# Patient Record
Sex: FEMALE | Race: WHITE | HISPANIC OR LATINO | Employment: STUDENT | URBAN - METROPOLITAN AREA
[De-identification: names, ages, dates, MRNs, and addresses within clinical notes are randomized per-mention and may not be internally consistent; named-entity substitution may affect disease eponyms.]

---

## 2023-10-08 ENCOUNTER — APPOINTMENT (OUTPATIENT)
Dept: GENERAL RADIOLOGY | Facility: HOSPITAL | Age: 48
End: 2023-10-08
Payer: COMMERCIAL

## 2023-10-08 ENCOUNTER — HOSPITAL ENCOUNTER (OUTPATIENT)
Facility: HOSPITAL | Age: 48
Setting detail: OBSERVATION
Discharge: HOME | End: 2023-10-09
Attending: EMERGENCY MEDICINE | Admitting: INTERNAL MEDICINE
Payer: COMMERCIAL

## 2023-10-08 ENCOUNTER — APPOINTMENT (OUTPATIENT)
Dept: CT IMAGING | Facility: HOSPITAL | Age: 48
End: 2023-10-08
Payer: COMMERCIAL

## 2023-10-08 DIAGNOSIS — R56.9 NEW ONSET SEIZURE: Primary | ICD-10-CM

## 2023-10-08 DIAGNOSIS — R41.82 ALTERED MENTAL STATUS, UNSPECIFIED ALTERED MENTAL STATUS TYPE: ICD-10-CM

## 2023-10-08 LAB
ALBUMIN SERPL-MCNC: 4 G/DL (ref 3.5–5.2)
ALBUMIN/GLOB SERPL: 1.3 G/DL
ALP SERPL-CCNC: 64 U/L (ref 39–117)
ALT SERPL W P-5'-P-CCNC: 14 U/L (ref 1–33)
AMPHET+METHAMPHET UR QL: NEGATIVE
AMPHETAMINES UR QL: NEGATIVE
ANION GAP SERPL CALCULATED.3IONS-SCNC: 11 MMOL/L (ref 5–15)
AST SERPL-CCNC: 20 U/L (ref 1–32)
BACTERIA UR QL AUTO: ABNORMAL /HPF
BARBITURATES UR QL SCN: NEGATIVE
BASOPHILS # BLD AUTO: 0.05 10*3/MM3 (ref 0–0.2)
BASOPHILS NFR BLD AUTO: 0.6 % (ref 0–1.5)
BENZODIAZ UR QL SCN: NEGATIVE
BILIRUB SERPL-MCNC: 0.5 MG/DL (ref 0–1.2)
BILIRUB UR QL STRIP: NEGATIVE
BUN SERPL-MCNC: 12 MG/DL (ref 6–20)
BUN/CREAT SERPL: 13.8 (ref 7–25)
BUPRENORPHINE SERPL-MCNC: NEGATIVE NG/ML
CALCIUM SPEC-SCNC: 8.7 MG/DL (ref 8.6–10.5)
CANNABINOIDS SERPL QL: NEGATIVE
CHLORIDE SERPL-SCNC: 104 MMOL/L (ref 98–107)
CLARITY UR: CLEAR
CO2 SERPL-SCNC: 20 MMOL/L (ref 22–29)
COCAINE UR QL: NEGATIVE
COLOR UR: YELLOW
CREAT SERPL-MCNC: 0.87 MG/DL (ref 0.57–1)
DEPRECATED RDW RBC AUTO: 47.4 FL (ref 37–54)
EGFRCR SERPLBLD CKD-EPI 2021: 82.8 ML/MIN/1.73
EOSINOPHIL # BLD AUTO: 0.12 10*3/MM3 (ref 0–0.4)
EOSINOPHIL NFR BLD AUTO: 1.5 % (ref 0.3–6.2)
ERYTHROCYTE [DISTWIDTH] IN BLOOD BY AUTOMATED COUNT: 13.5 % (ref 12.3–15.4)
FENTANYL UR-MCNC: NEGATIVE NG/ML
GLOBULIN UR ELPH-MCNC: 3 GM/DL
GLUCOSE SERPL-MCNC: 91 MG/DL (ref 65–99)
GLUCOSE UR STRIP-MCNC: NEGATIVE MG/DL
HCT VFR BLD AUTO: 36.1 % (ref 34–46.6)
HGB BLD-MCNC: 12 G/DL (ref 12–15.9)
HGB UR QL STRIP.AUTO: ABNORMAL
HOLD SPECIMEN: NORMAL
HYALINE CASTS UR QL AUTO: ABNORMAL /LPF
IMM GRANULOCYTES # BLD AUTO: 0.01 10*3/MM3 (ref 0–0.05)
IMM GRANULOCYTES NFR BLD AUTO: 0.1 % (ref 0–0.5)
KETONES UR QL STRIP: ABNORMAL
LEUKOCYTE ESTERASE UR QL STRIP.AUTO: NEGATIVE
LYMPHOCYTES # BLD AUTO: 2.33 10*3/MM3 (ref 0.7–3.1)
LYMPHOCYTES NFR BLD AUTO: 29.5 % (ref 19.6–45.3)
MAGNESIUM SERPL-MCNC: 2.5 MG/DL (ref 1.6–2.6)
MCH RBC QN AUTO: 31.7 PG (ref 26.6–33)
MCHC RBC AUTO-ENTMCNC: 33.2 G/DL (ref 31.5–35.7)
MCV RBC AUTO: 95.3 FL (ref 79–97)
METHADONE UR QL SCN: NEGATIVE
MONOCYTES # BLD AUTO: 0.55 10*3/MM3 (ref 0.1–0.9)
MONOCYTES NFR BLD AUTO: 7 % (ref 5–12)
NEUTROPHILS NFR BLD AUTO: 4.84 10*3/MM3 (ref 1.7–7)
NEUTROPHILS NFR BLD AUTO: 61.3 % (ref 42.7–76)
NITRITE UR QL STRIP: NEGATIVE
NRBC BLD AUTO-RTO: 0 /100 WBC (ref 0–0.2)
OPIATES UR QL: NEGATIVE
OXYCODONE UR QL SCN: NEGATIVE
PCP UR QL SCN: NEGATIVE
PH UR STRIP.AUTO: 7 [PH] (ref 5–8)
PLATELET # BLD AUTO: 367 10*3/MM3 (ref 140–450)
PMV BLD AUTO: 7.7 FL (ref 6–12)
POTASSIUM SERPL-SCNC: 3.9 MMOL/L (ref 3.5–5.2)
PROPOXYPH UR QL: NEGATIVE
PROT SERPL-MCNC: 7 G/DL (ref 6–8.5)
PROT UR QL STRIP: NEGATIVE
QT INTERVAL: 392 MS
QTC INTERVAL: 407 MS
RBC # BLD AUTO: 3.79 10*6/MM3 (ref 3.77–5.28)
RBC # UR STRIP: ABNORMAL /HPF
REF LAB TEST METHOD: ABNORMAL
SODIUM SERPL-SCNC: 135 MMOL/L (ref 136–145)
SP GR UR STRIP: 1.01 (ref 1–1.03)
SQUAMOUS #/AREA URNS HPF: ABNORMAL /HPF
TRICYCLICS UR QL SCN: NEGATIVE
TROPONIN T SERPL HS-MCNC: <6 NG/L
UROBILINOGEN UR QL STRIP: ABNORMAL
WBC # UR STRIP: ABNORMAL /HPF
WBC NRBC COR # BLD: 7.9 10*3/MM3 (ref 3.4–10.8)
WHOLE BLOOD HOLD COAG: NORMAL
WHOLE BLOOD HOLD SPECIMEN: NORMAL

## 2023-10-08 PROCEDURE — G0378 HOSPITAL OBSERVATION PER HR: HCPCS

## 2023-10-08 PROCEDURE — 83735 ASSAY OF MAGNESIUM: CPT | Performed by: EMERGENCY MEDICINE

## 2023-10-08 PROCEDURE — 84484 ASSAY OF TROPONIN QUANT: CPT | Performed by: EMERGENCY MEDICINE

## 2023-10-08 PROCEDURE — 81001 URINALYSIS AUTO W/SCOPE: CPT | Performed by: INTERNAL MEDICINE

## 2023-10-08 PROCEDURE — 99284 EMERGENCY DEPT VISIT MOD MDM: CPT

## 2023-10-08 PROCEDURE — 0 LEVETIRACETAM IN NACL 0.75% 1000 MG/100ML SOLUTION: Performed by: EMERGENCY MEDICINE

## 2023-10-08 PROCEDURE — 85025 COMPLETE CBC W/AUTO DIFF WBC: CPT | Performed by: EMERGENCY MEDICINE

## 2023-10-08 PROCEDURE — 80053 COMPREHEN METABOLIC PANEL: CPT | Performed by: EMERGENCY MEDICINE

## 2023-10-08 PROCEDURE — 71045 X-RAY EXAM CHEST 1 VIEW: CPT

## 2023-10-08 PROCEDURE — 93005 ELECTROCARDIOGRAM TRACING: CPT

## 2023-10-08 PROCEDURE — 96374 THER/PROPH/DIAG INJ IV PUSH: CPT

## 2023-10-08 PROCEDURE — 70450 CT HEAD/BRAIN W/O DYE: CPT

## 2023-10-08 PROCEDURE — 80307 DRUG TEST PRSMV CHEM ANLYZR: CPT | Performed by: INTERNAL MEDICINE

## 2023-10-08 PROCEDURE — 93005 ELECTROCARDIOGRAM TRACING: CPT | Performed by: EMERGENCY MEDICINE

## 2023-10-08 RX ORDER — BISACODYL 5 MG/1
5 TABLET, DELAYED RELEASE ORAL DAILY PRN
Status: DISCONTINUED | OUTPATIENT
Start: 2023-10-08 | End: 2023-10-09 | Stop reason: HOSPADM

## 2023-10-08 RX ORDER — SODIUM CHLORIDE 0.9 % (FLUSH) 0.9 %
10 SYRINGE (ML) INJECTION EVERY 12 HOURS SCHEDULED
Status: DISCONTINUED | OUTPATIENT
Start: 2023-10-09 | End: 2023-10-09 | Stop reason: HOSPADM

## 2023-10-08 RX ORDER — SODIUM CHLORIDE 0.9 % (FLUSH) 0.9 %
10 SYRINGE (ML) INJECTION AS NEEDED
Status: DISCONTINUED | OUTPATIENT
Start: 2023-10-08 | End: 2023-10-09 | Stop reason: HOSPADM

## 2023-10-08 RX ORDER — LEVETIRACETAM 10 MG/ML
1000 INJECTION INTRAVASCULAR ONCE
Status: COMPLETED | OUTPATIENT
Start: 2023-10-08 | End: 2023-10-08

## 2023-10-08 RX ORDER — BISACODYL 10 MG
10 SUPPOSITORY, RECTAL RECTAL DAILY PRN
Status: DISCONTINUED | OUTPATIENT
Start: 2023-10-08 | End: 2023-10-09 | Stop reason: HOSPADM

## 2023-10-08 RX ORDER — AMOXICILLIN 250 MG
2 CAPSULE ORAL 2 TIMES DAILY
Status: DISCONTINUED | OUTPATIENT
Start: 2023-10-09 | End: 2023-10-09 | Stop reason: HOSPADM

## 2023-10-08 RX ORDER — SODIUM CHLORIDE 9 MG/ML
40 INJECTION, SOLUTION INTRAVENOUS AS NEEDED
Status: DISCONTINUED | OUTPATIENT
Start: 2023-10-08 | End: 2023-10-09 | Stop reason: HOSPADM

## 2023-10-08 RX ORDER — POLYETHYLENE GLYCOL 3350 17 G/17G
17 POWDER, FOR SOLUTION ORAL DAILY PRN
Status: DISCONTINUED | OUTPATIENT
Start: 2023-10-08 | End: 2023-10-09 | Stop reason: HOSPADM

## 2023-10-08 RX ORDER — LORAZEPAM 2 MG/ML
1 INJECTION INTRAMUSCULAR ONCE AS NEEDED
Status: DISCONTINUED | OUTPATIENT
Start: 2023-10-08 | End: 2023-10-09 | Stop reason: HOSPADM

## 2023-10-08 RX ADMIN — Medication 10 ML: at 23:58

## 2023-10-08 RX ADMIN — LEVETIRACETAM INJECTION 1000 MG: 10 INJECTION INTRAVENOUS at 21:09

## 2023-10-08 NOTE — ED PROVIDER NOTES
[place]    EMERGENCY DEPARTMENT ENCOUNTER      Pt Name: Sade Jha  MRN: [id number]  YOB: 1975  Date of evaluation: 10/8/2023  Provider: Chapincito Piedra DO    CHIEF COMPLAINT       Chief Complaint   Patient presents with    Syncope       HPI  Stated Reason for Visit: pt presents to er by ems from home. Pt  reports she walked to the bathroom and fell, possibly hit her head, and then started shaking and eye fluttering. EMS states they saw the same plus her hands shaking. pt has unknown h/o seizures or syncope except for a similar episode had 1 week ago. History Obtained From: patient;EMS       HISTORY OF PRESENT ILLNESS  (Location/Symptom, Timing/Onset, Context/Setting, Quality, Duration, Modifying Factors, Severity.)   Sade Jha is a 48 y.o. female who presents to the emergency department for evaluation by EMS from home secondary to an episode which occurred just prior to arrival.  The  states he found the patient in the bathroom, she had a staring spell where she was laying against the bathroom wall, slid down to the ground slowly.  He notes she had fluttering of her eyelids and some intermittent jerking of her bilateral upper extremities.  The patient states she just felt lightheaded prior to this episode, states she was still awake and alert during this episode and felt her arm shaking but she could not stop it.  This lasted a few minutes.   notes she did not have any loss of bowel or bladder function.  No significant head injury very mildly against the wall in the bathroom prior to going down to the ground.  Patient states about a 1 week ago and she was on a boat in [place] on a tour when she had a similar episode which self resolved, she was monitored on the boat but was not taken to hospital for further assessment at that time.  She denies any head injuries, any neurological issues growing up, no history of seizures, headaches, unilateral weakness, numbness or  "tingling.  She notes she does have a lot of stress in her life, has 3 teenage children.  She in the past has been on Paxil but has not been taking for the last few weeks.  She denies any recent illness, no fevers or chills.  She notes just some generalized fatigue after this incident.  Does not have any issues with chronic alcoholism or any drugs of abuse.  She has no other acute systemic complaints at this time.  No headache, vision changes, unilateral weakness, numbness or tingling.      Nursing notes were reviewed.      PAST MEDICAL HISTORY   No past medical history on file.      SURGICAL HISTORY       Past Surgical History:   Procedure Laterality Date    BREAST SURGERY           CURRENT MEDICATIONS       Current Facility-Administered Medications:     levETIRAcetam in NaCl 0.75% (KEPPRA) IVPB 1,000 mg, 1,000 mg, Intravenous, Once, Chapincito Piedra, DO    sodium chloride 0.9 % flush 10 mL, 10 mL, Intravenous, PRN, Chapincito Piedra, DO  No current outpatient medications on file.    ALLERGIES     Patient has no known allergies.    FAMILY HISTORY     No family history on file.       SOCIAL HISTORY       Social History     Socioeconomic History    Marital status:    Tobacco Use    Smoking status: Never    Smokeless tobacco: Never   Substance and Sexual Activity    Alcohol use: Yes     Comment: 1-2 times per month    Drug use: Never         PHYSICAL EXAM    (up to 7 for level 4, 8 or more for level 5)     Vitals:    10/08/23 1643 10/08/23 1700 10/08/23 1730   BP: 138/89 150/86 143/84   Pulse: 72 70 74   Resp: 16     Temp: 99.2 øF (37.3 øC)     TempSrc: Oral     SpO2: 100% 100% 100%   Weight: 59 kg (130 lb)     Height: 160 cm (63\")         Physical Exam  General : Patient is awake, alert, oriented, in no acute distress, nontoxic appearing, GCS 15  HEENT: Pupils are equally round, EOMI, conjunctivae clear, there is no injection no icterus.  Oral mucosa is moist, uvula midline  Neck: Neck is supple, full " range of motion, trachea midline  Cardiac: Heart regular rate, rhythm, no murmurs, rubs, or gallops  Lungs: Lungs are clear to auscultation, there is no wheezing, rhonchi, or rales. There is no use of accessory muscles  Chest wall: There is no tenderness to palpation over the chest wall or over ribs  Abdomen: Abdomen is soft, nontender, nondistended. There are no firm or pulsatile masses, no rebound rigidity or guarding  Musculoskeletal: 5 out of 5 strength in all 4 extremities.  No focal muscle deficits are appreciated  Neuro: Motor intact, sensory intact, level of consciousness is normal, cerebellar function is normal, reflexes are grossly normal.  5-5 strength bilateral upper and lower extremities, normal finger-to-nose, no dysdiadochokinesia, no pronator drift, no focal neurological deficit on evaluation  Dermatology: Very small superficial abrasion to right lateral forehead, head otherwise normocephalic, skin is warm and dry  Psych: Mentation is grossly normal, cognition is grossly normal. Affect is appropriate      DIAGNOSTIC RESULTS     EKG:  All EKGs are interpreted by the Emergency Department Physician who either signs or Co-signs this chart in the absence of a cardiologist.    ECG 12 Lead ED Triage Standing Order; Weak / Dizzy / AMS   Final Result   Test Reason : SYNCOPE   Blood Pressure :   */*   mmHG   Vent. Rate :  65 BPM     Atrial Rate :  65 BPM      P-R Int : 124 ms          QRS Dur :  88 ms       QT Int : 392 ms       P-R-T Axes :  54  70  57 degrees      QTc Int : 407 ms      Normal sinus rhythm   Normal ECG   No previous ECGs available   Confirmed by JOAQUIM SEYMOUR MD ([id number]) on 10/8/2023 5:07:56 PM      Referred By: EDMD           Confirmed By: JOAQUIM SEYMOUR MD          RADIOLOGY:     [x] Radiologist's Report Reviewed:  CT Head Without Contrast   Final Result   Impression:   No acute intracranial abnormality            Electronically Signed: Manuel Butts MD     10/8/2023 5:47 PM EDT      Workstation ID: [id number]      XR Chest 1 View   Final Result   Impression:   Breast tissue shadow overlying the lower right and left chest. Allowing for this, no evidence of active disease.         Electronically Signed: Tai Matson MD     10/8/2023 5:03 PM EDT     Workstation ID: [id number]          I ordered and independently reviewed the above noted radiographic studies.      I viewed images of chest x-ray which showed no acute cardiopulmonary process per my independent interpretation.    See radiologist's dictation for official interpretation.      ED BEDSIDE ULTRASOUND:   Performed by ED Physician - none    LABS:    I have reviewed and interpreted all of the currently available lab results from this visit (if applicable):  Results for orders placed or performed during the hospital encounter of 10/08/23   Comprehensive Metabolic Panel    Specimen: Blood   Result Value Ref Range    Glucose 91 65 - 99 mg/dL    BUN 12 6 - 20 mg/dL    Creatinine 0.87 0.57 - 1.00 mg/dL    Sodium 135 (L) 136 - 145 mmol/L    Potassium 3.9 3.5 - 5.2 mmol/L    Chloride 104 98 - 107 mmol/L    CO2 20.0 (L) 22.0 - 29.0 mmol/L    Calcium 8.7 8.6 - 10.5 mg/dL    Total Protein 7.0 6.0 - 8.5 g/dL    Albumin 4.0 3.5 - 5.2 g/dL    ALT (SGPT) 14 1 - 33 U/L    AST (SGOT) 20 1 - 32 U/L    Alkaline Phosphatase 64 39 - 117 U/L    Total Bilirubin 0.5 0.0 - 1.2 mg/dL    Globulin 3.0 gm/dL    A/G Ratio 1.3 g/dL    BUN/Creatinine Ratio 13.8 7.0 - 25.0    Anion Gap 11.0 5.0 - 15.0 mmol/L    eGFR 82.8 >60.0 mL/min/1.73   Single High Sensitivity Troponin T    Specimen: Blood   Result Value Ref Range    HS Troponin T <6 <10 ng/L   Magnesium    Specimen: Blood   Result Value Ref Range    Magnesium 2.5 1.6 - 2.6 mg/dL   CBC Auto Differential    Specimen: Blood   Result Value Ref Range    WBC 7.90 3.40 - 10.80 10*3/mm3    RBC 3.79 3.77 - 5.28 10*6/mm3    Hemoglobin 12.0 12.0 - 15.9 g/dL    Hematocrit 36.1 34.0 - 46.6 %    MCV 95.3 79.0 - 97.0 fL    MCH 31.7 26.6 -  33.0 pg    MCHC 33.2 31.5 - 35.7 g/dL    RDW 13.5 12.3 - 15.4 %    RDW-SD 47.4 37.0 - 54.0 fl    MPV 7.7 6.0 - 12.0 fL    Platelets 367 140 - 450 10*3/mm3    Neutrophil % 61.3 42.7 - 76.0 %    Lymphocyte % 29.5 19.6 - 45.3 %    Monocyte % 7.0 5.0 - 12.0 %    Eosinophil % 1.5 0.3 - 6.2 %    Basophil % 0.6 0.0 - 1.5 %    Immature Grans % 0.1 0.0 - 0.5 %    Neutrophils, Absolute 4.84 1.70 - 7.00 10*3/mm3    Lymphocytes, Absolute 2.33 0.70 - 3.10 10*3/mm3    Monocytes, Absolute 0.55 0.10 - 0.90 10*3/mm3    Eosinophils, Absolute 0.12 0.00 - 0.40 10*3/mm3    Basophils, Absolute 0.05 0.00 - 0.20 10*3/mm3    Immature Grans, Absolute 0.01 0.00 - 0.05 10*3/mm3    nRBC 0.0 0.0 - 0.2 /100 WBC   ECG 12 Lead ED Triage Standing Order; Weak / Dizzy / AMS   Result Value Ref Range    QT Interval 392 ms    QTC Interval 407 ms   Green Top (Gel)   Result Value Ref Range    Extra Tube Hold for add-ons.    Lavender Top   Result Value Ref Range    Extra Tube hold for add-on    Gold Top - SST   Result Value Ref Range    Extra Tube Hold for add-ons.    Light Blue Top   Result Value Ref Range    Extra Tube Hold for add-ons.         If labs were ordered, I independently reviewed the results and considered them in treating the patient.      EMERGENCY DEPARTMENT COURSE and DIFFERENTIAL DIAGNOSIS/MDM:   Vitals:  AS OF 20:56 EDT    BP - 143/84  HR - 74  TEMP - 99.2 øF (37.3 øC) (Oral)  O2 SATS - 100%      Orders placed during this visit:  Orders Placed This Encounter   Procedures    XR Chest 1 View    CT Head Without Contrast    [place] Draw    Comprehensive Metabolic Panel    Single High Sensitivity Troponin T    Magnesium    CBC Auto Differential    Urinalysis With Culture If Indicated -    Urine Drug Screen - Urine, Clean Catch    NPO Diet NPO Type: Strict NPO    Undress & Gown    Continuous Pulse Oximetry    Vital Signs    Code Status and Medical Interventions:    Oxygen Therapy- Nasal Cannula; Titrate 1-6 LPM Per SpO2; 90 - 95%    ECG 12 Lead  ED Triage Standing Order; Weak / Dizzy / AMS    Insert Peripheral IV    Initiate Observation Status    Fall Precautions    CBC & Differential    Green Top (Gel)    Lavender Top    Gold Top - SST    Gray Top    Light Blue Top       All labs have been independently reviewed by me.  All radiology studies have been reviewed by me and the radiologist dictating the report.  All EKG's have been independently viewed and interpreted by me.      Discussion below represents my analysis of pertinent findings related to patient's condition, differential diagnosis, treatment plan and final disposition.    Differential diagnosis:  The differential diagnosis associated with the patient's presentation includes: Partial seizure, stress, nonepileptic seizure, stress, psychiatric disorder    Additional sources  Discussed/ obtained information from independent historians:   [] Spouse  [] Parent  [x] Family member,  at bedside  [] Friend  [x] EMS   [] Other:    External (non-ED) record review:   [] Inpatient record:   [] Office record:   [] Outpatient record:   [] Prior Outpatient labs:   [] Prior Outpatient radiology:   [] Primary Care record:   [] Outside ED record:   [] Other:     Patient's care impacted by:   [] Diabetes  [] Hypertension  [] CHF  [] Hyperlipidemia  [] Coronary Artery Disease   [] COPD   [] Cancer   [] Tobacco Abuse   [] Substance Abuse    [] Other:     Care significantly affected by Social Determinants of Health (housing and economic circumstances, unemployment)    [] Yes     [x] No   If yes, Patient's care significantly limited by Social Determinants of Health including:   [] Inadequate housing   [] Low income   [] Alcoholism and drug addiction in family   [] Problems related to primary support group   [] Unemployment   [] Problems related to employment   [] Other Social Determinants of Health:       MEDICATIONS ADMINISTERED IN ED:  Medications   sodium chloride 0.9 % flush 10 mL (has no administration in  time range)   levETIRAcetam in NaCl 0.75% (KEPPRA) IVPB 1,000 mg (has no administration in time range)              This is a pleasant 48-year-old female who has had a couple episodes once last week and again today alteration in state, and dizziness, partial seizure activity, fluttering of her eyelids and her upper extremities which she notes she is still awake during these episodes without any signs of complete epilepsy or loss of bowel or bladder function.  I feel this may be partial nonepileptic type seizure activity or stress-induced pathology.  She is nontoxic-appearing, is not have any focal neurological deficit on examination.  The patient is with stable vital signs during my examination.  Labs not reveal any significant metabolic abnormality.  Her CT head is also unremarkable.  She has remained awake and alert during my evaluation this evening.  I did discuss the case with on-call neurologist, Dr. Sandhu, appreciate his input.  Recommend starting patient on Keppra, likely benefit from 500 mg twice daily prescription.  He notes since she has had 2 episodes with no prior neurological work-up inpatient versus outpatient options he would recommend inpatient for neuro assessment, MRI, possibly setting up for EEG rather than as an outpatient which may take a prolonged period of time.  I updated the patient and family on the options, they would prefer admission for further work-up neurological checks and treatments we will plan for admission, case discussed with hospitalist, Dr. Hernandez, for admission.        PROCEDURES:  Procedures    CRITICAL CARE TIME    Total Critical Care time was 0 minutes, excluding separately reportable procedures.   There was a high probability of clinically significant/life threatening deterioration in the patient's condition which required my urgent intervention.      FINAL IMPRESSION      1. New onset seizure    2. Altered mental status, unspecified altered mental status type           DISPOSITION/PLAN     ED Disposition       ED Disposition   Decision to Admit    Condition   --    Comment   Level of Care: Telemetry [5]   Diagnosis: Seizure [205090]   Admitting Physician: PEDRITO ROQUE [[id number]]   Attending Physician: PEDRITO ROQUE [[id number]]                   Comment: Please note this report has been produced using speech recognition software.      Chapincito Piedra DO  Attending Emergency Physician         Chapincito Piedra DO  10/08/23 2059

## 2023-10-09 ENCOUNTER — APPOINTMENT (OUTPATIENT)
Dept: MRI IMAGING | Facility: HOSPITAL | Age: 48
End: 2023-10-09
Payer: COMMERCIAL

## 2023-10-09 ENCOUNTER — APPOINTMENT (OUTPATIENT)
Dept: NEUROLOGY | Facility: HOSPITAL | Age: 48
End: 2023-10-09
Payer: COMMERCIAL

## 2023-10-09 VITALS
HEART RATE: 70 BPM | RESPIRATION RATE: 18 BRPM | OXYGEN SATURATION: 100 % | BODY MASS INDEX: 21.63 KG/M2 | TEMPERATURE: 98.2 F | SYSTOLIC BLOOD PRESSURE: 122 MMHG | DIASTOLIC BLOOD PRESSURE: 75 MMHG | WEIGHT: 122.1 LBS | HEIGHT: 63 IN

## 2023-10-09 PROCEDURE — 0 GADOBENATE DIMEGLUMINE 529 MG/ML SOLUTION: Performed by: INTERNAL MEDICINE

## 2023-10-09 PROCEDURE — G0378 HOSPITAL OBSERVATION PER HR: HCPCS

## 2023-10-09 PROCEDURE — A9577 INJ MULTIHANCE: HCPCS | Performed by: INTERNAL MEDICINE

## 2023-10-09 PROCEDURE — 70553 MRI BRAIN STEM W/O & W/DYE: CPT

## 2023-10-09 PROCEDURE — 95819 EEG AWAKE AND ASLEEP: CPT | Performed by: PSYCHIATRY & NEUROLOGY

## 2023-10-09 PROCEDURE — 99204 OFFICE O/P NEW MOD 45 MIN: CPT

## 2023-10-09 PROCEDURE — 95819 EEG AWAKE AND ASLEEP: CPT

## 2023-10-09 RX ORDER — LEVETIRACETAM 500 MG/1
500 TABLET ORAL EVERY 12 HOURS SCHEDULED
Qty: 60 TABLET | Refills: 1 | Status: SHIPPED | OUTPATIENT
Start: 2023-10-09 | End: 2023-12-08

## 2023-10-09 RX ORDER — LEVETIRACETAM 500 MG/1
500 TABLET ORAL EVERY 12 HOURS SCHEDULED
Status: DISCONTINUED | OUTPATIENT
Start: 2023-10-09 | End: 2023-10-09 | Stop reason: HOSPADM

## 2023-10-09 RX ADMIN — GADOBENATE DIMEGLUMINE 10 ML: 529 INJECTION, SOLUTION INTRAVENOUS at 00:51

## 2023-10-09 RX ADMIN — Medication 10 ML: at 10:09

## 2023-10-09 RX ADMIN — LEVETIRACETAM 500 MG: 500 TABLET, FILM COATED ORAL at 11:23

## 2023-10-09 NOTE — PLAN OF CARE
Problem: Adult Inpatient Plan of Care  Goal: Plan of Care Review  Outcome: Ongoing, Progressing  Flowsheets (Taken 10/8/2023 2312)  Plan of Care Reviewed With: patient  Goal: Patient-Specific Goal (Individualized)  Outcome: Ongoing, Progressing  Goal: Absence of Hospital-Acquired Illness or Injury  Outcome: Ongoing, Progressing  Goal: Optimal Comfort and Wellbeing  Outcome: Ongoing, Progressing  Goal: Readiness for Transition of Care  Outcome: Ongoing, Progressing  Intervention: Mutually Develop Transition Plan  Recent Flowsheet Documentation  Taken 10/8/2023 2309 by Tiff Godfrey, RN  Equipment Currently Used at Home: none  Transportation Anticipated: family or friend will provide  Patient/Family Anticipated Services at Transition: none  Patient/Family Anticipates Transition to: home with family   Goal Outcome Evaluation:  Plan of Care Reviewed With: patient

## 2023-10-09 NOTE — H&P
"    [facility] Medicine Services  HISTORY AND PHYSICAL    Patient Name: Sade Jha  : 1975  MRN: [id number]  Primary Care Physician: Provider, No Known  Date of admission: 10/8/2023      Subjective   Subjective     Chief Complaint:   seizure    HPI:  Sade Jha is a 48 y.o. female  here for evaluation of possible seizure for 3rd time over past week with the first being in [place] on tour boat.  Pt states she was on tour boat Thursday and had aura prior to \"seizure\" and then had shaking all over.  Pt states she could hear and understand what was going on around her but was not able to respond.  Pt notes then she returned to her baseline and then the same episode happened again.  Pt states she then felt tired following the event.  Had not had much to eat as she woke up late.  Today pt was going to eat and similar episode happened except only involved her arms/upper body shaking.  No loss of bowel or bladder control.  Has headache now but not before.  Has blurry vision that she noted in airport and still present somewhat today.  No f/c.  No shortness of breath/cough/chest pain/dysuria/leg swelling.  No drug or etoh use other than social etoh use 1-2 times per month.  No fam hx of seizure.        ED has d/w neurology who recommends MRI and possible EEG.  Keppra 1 gm given in ER.    Personal History     No past medical history on file.  none        Past Surgical History:   Procedure Laterality Date    BREAST SURGERY         Family History: family history is not on file.     Social History:  reports that she has never smoked. She has never used smokeless tobacco. She reports current alcohol use. She reports that she does not use drugs. ALCOHOL USE IS MINIMAL - 1-2 DRINKS PER MONTH  Social History     Social History Narrative    Not on file       Medications:  Available home medication information reviewed.  (Not in a hospital admission)      No Known Allergies    Objective   Objective "     Vital Signs:   Temp:  [99.2 øF (37.3 øC)] 99.2 øF (37.3 øC)  Heart Rate:  [70-74] 74  Resp:  [16] 16  BP: (138-150)/(84-89) 143/84       Physical Exam    Gen; alert, oriented,nad  Heent; perrla, eomi, mmm  Cv; rrr, no mrg  L; ctab, no wheeze/crackles  Abd; soft, +bs, ntnd, no r/g  Ext; no cce, palpable pulses  Skin; cdi, warm  Neuro;5/5 motor and sensation intact, cn's intact; no gross deficits  Psych; mood and affect appropriate      Result Review:  I have personally reviewed the results from the time of this admission to 10/8/2023 20:51 EDT and agree with these findings:  [x]  Laboratory list / accordion  [x]  Microbiology  [x]  Radiology  [x]  EKG/Telemetry   []  Cardiology/Vascular   []  Pathology  []  Old records  []  Other:  Most notable findings include:          LAB RESULTS:      Lab 10/08/23  1647   WBC 7.90   HEMOGLOBIN 12.0   HEMATOCRIT 36.1   PLATELETS 367   NEUTROS ABS 4.84   IMMATURE GRANS (ABS) 0.01   LYMPHS ABS 2.33   MONOS ABS 0.55   EOS ABS 0.12   MCV 95.3         Lab 10/08/23  1647   SODIUM 135*   POTASSIUM 3.9   CHLORIDE 104   CO2 20.0*   ANION GAP 11.0   BUN 12   CREATININE 0.87   EGFR 82.8   GLUCOSE 91   CALCIUM 8.7   MAGNESIUM 2.5         Lab 10/08/23  1647   TOTAL PROTEIN 7.0   ALBUMIN 4.0   GLOBULIN 3.0   ALT (SGPT) 14   AST (SGOT) 20   BILIRUBIN 0.5   ALK PHOS 64         Lab 10/08/23  1647   HSTROP T <6                     Microbiology Results (last 10 days)       ** No results found for the last 240 hours. **            CT Head Without Contrast    Result Date: 10/8/2023  CT HEAD WO CONTRAST Date of Exam: 10/8/2023 5:34 PM EDT Indication: fall, head injury. Comparison: None available. Technique: Axial CT images were obtained of the head without contrast administration.  Automated exposure control and iterative construction methods were used. Findings: There is no evidence of hemorrhage. There is no mass effect or midline shift. There is no extracerebral collection. Ventricles are normal  in size and configuration for patient's stated age. Posterior fossa is within normal limits. Calvarium and skull base appear intact.  Visualized sinuses show no air fluid levels. Visualized orbits are unremarkable.     Impression: Impression: No acute intracranial abnormality Electronically Signed: Manuel Butts MD  10/8/2023 5:47 PM EDT  Workstation ID: [id number]    XR Chest 1 View    Result Date: 10/8/2023  XR CHEST 1 VW Date of Exam: 10/8/2023 4:46 PM EDT Indication: Weak/Dizzy/AMS triage protocol Comparison: None available. Findings: The heart, mediastinum and pulmonary vasculature appear within normal limits. Hazy opacity of the lower chest is apparently due to overlying breast tissue shadow. Allowing for this, no evidence of active pulmonary parenchymal disease is seen. No pneumothorax, edema or effusion is seen. Bony structures appear to be intact.     Impression: Impression: Breast tissue shadow overlying the lower right and left chest. Allowing for this, no evidence of active disease. Electronically Signed: Tai Matson MD  10/8/2023 5:03 PM EDT  Workstation ID: [id number]         Assessment & Plan   Assessment & Plan     Active Hospital Problems    Diagnosis  POA    **Seizure [R56.9]  Yes     47 y/o female w/ no past medical hx here w/  ?new onset seizure  -MRI, EEG  -keppra given in ER  -neurology notified by er and will see in a.m.  -no hx of etoh/drug use and electrolytes/glc normal   -seizure precautions          DVT prophylaxis:   mechanical      CODE STATUS:     Code Status and Medical Interventions:   Ordered at: 10/08/23 2042     Code Status (Patient has no pulse and is not breathing):    CPR (Attempt to Resuscitate)     Medical Interventions (Patient has pulse or is breathing):    Full Support       Expected Discharge  1-2 days  Expected discharge date/ time has not been documented.     Rashmi Hernandez MD  10/08/23  Electronically signed by Rashmi Hernandez MD, 10/08/23, 8:53 PM EDT.

## 2023-10-09 NOTE — DISCHARGE SUMMARY
[facility] Medicine Services  DISCHARGE SUMMARY    Patient Name: Sade Jha  : 1975  MRN: [id number]    Date of Admission: 10/8/2023  4:42 PM  Date of Discharge: 10/9/2023  Primary Care Physician: Provider, No Known    Consults       Date and Time Order Name Status Description    10/9/2023 12:32 AM Inpatient Neurology Consult General              Hospital Course     Presenting Problem: Seizure activity    Active Hospital Problems    Diagnosis  POA    **Seizure [R56.9]  Yes      Resolved Hospital Problems   No resolved problems to display.          Hospital Course:  Sade Jha is a 48 y.o. female  here for evaluation of possible seizure activity.  She has had 3 episodes in the past 3 weeks.  MRI of the brain was obtained and showed no acute intracranial abnormality.  EEG was performed and showed no seizure activity.  She was started on Keppra.  Neurology was consulted and recommended discharge with Keppra and follow-up with Dr. Shaver.  She has been counseled not to drive until she is 3 months of seizure-free    Discharge Follow Up Recommendations for outpatient labs/diagnostics:  Follow-up with Dr. Shaver in 2 to 3 months    Day of Discharge     HPI:   Patient is doing well this morning.  Has had no further seizure activity.  Awake and alert    Review of Systems  General: denies fevers or chills  CV: denies chest pain  Resp: denies shortness of breath  Abd: denies abd pain, nausea      Vital Signs:   Temp:  [98 øF (36.7 øC)-99.2 øF (37.3 øC)] 98.2 øF (36.8 øC)  Heart Rate:  [63-77] 70  Resp:  [16-18] 18  BP: (101-150)/(63-89) 122/75      Physical Exam:  Constitutional: No acute distress, awake, alert  Respiratory: Clear to auscultation bilaterally, respiratory effort normal   Cardiovascular: RRR, no murmurs, rubs, or gallops  Gastrointestinal: Positive bowel sounds, soft, nontender, nondistended  Musculoskeletal: No bilateral ankle edema  Psychiatric: Appropriate affect,  cooperative  Neurologic: Oriented x 3, no focal deficits        Pertinent  and/or Most Recent Results     LAB RESULTS:      Lab 10/08/23  1647   WBC 7.90   HEMOGLOBIN 12.0   HEMATOCRIT 36.1   PLATELETS 367   NEUTROS ABS 4.84   IMMATURE GRANS (ABS) 0.01   LYMPHS ABS 2.33   MONOS ABS 0.55   EOS ABS 0.12   MCV 95.3         Lab 10/08/23  1647   SODIUM 135*   POTASSIUM 3.9   CHLORIDE 104   CO2 20.0*   ANION GAP 11.0   BUN 12   CREATININE 0.87   EGFR 82.8   GLUCOSE 91   CALCIUM 8.7   MAGNESIUM 2.5         Lab 10/08/23  1647   TOTAL PROTEIN 7.0   ALBUMIN 4.0   GLOBULIN 3.0   ALT (SGPT) 14   AST (SGOT) 20   BILIRUBIN 0.5   ALK PHOS 64         Lab 10/08/23  1647   HSTROP T <6                 Brief Urine Lab Results  (Last result in the past 365 days)        Color   Clarity   Blood   Leuk Est   Nitrite   Protein   CREAT   Urine HCG        10/08/23 2105 Yellow   Clear   Large (3+)   Negative   Negative   Negative                 Microbiology Results (last 10 days)       ** No results found for the last 240 hours. **            EEG    Result Date: 10/9/2023  Reason for referral: 48 y.o.female with seizures Technical Summary:  A 19 channel digital EEG was performed using the international 10-20 placement system, including eye leads and EKG leads. Duration: 20 minutes Findings: The awake tracing shows diffuse low amplitude intermixed theta and alpha activity present symmetrically over both hemispheres.  At times a low amplitude 9 Hz posterior rhythm is evident symmetrically over the occipital leads.  Photic stimulation yields a symmetric driving response.  Hyperventilation is not performed.  Sleep is seen with slowing of the background, vertex waves and sleep spindles.  No focal features or epileptiform activity are seen. Video: Available Technical quality: Superior EKG: Regular, 60 bpm SUMMARY: Normal EEG in the awake and asleep states No focal features or epileptiform activity are seen     Normal study This report is  transcribed using the Dragon dictation system.      MRI Brain With & Without Contrast    Result Date: 10/9/2023  MRI BRAIN W WO CONTRAST Date of Exam: 10/9/2023 12:37 AM EDT Indication: Seizure, new-onset, no history of trauma.  Comparison: Head CT 10/8/2023. Technique:  Routine multiplanar/multisequence sequence images of the brain were obtained before and after the uneventful administration of 10 mL Multihance. Findings: There is no diffusion restriction to suggest acute infarct. There is no evidence of acute or chronic intracranial hemorrhage.  No mass effect or midline shift. No abnormal extra-axial collections.  There are no significant signal abnormalities within the  brain parenchyma.  The major vascular flow voids appear intact.  The basal ganglia, brainstem and cerebellum appear within normal limits.  Calvarial and superficial soft tissue signal is within normal limits.  Orbits appear unremarkable.  The paranasal sinuses and the mastoid air cells appear well aerated.  Midline structures are intact.  There is no abnormal intracranial enhancement. There is a small right frontal developmental venous anomaly. There is normal enhancement within the intracranial vasculature including the dural venous sinuses. There is no evidence of gray matter heterotopia. No obvious cortical dysplasia. No temporal lobe lesions. The hippocampus appears normal bilaterally. No evidence of mesial temporal sclerosis.     Impression: No acute intracranial abnormality. No epileptogenic focus identified. Electronically Signed: Oskar Grant MD  10/9/2023 1:26 AM EDT  Workstation ID: [id number]    CT Head Without Contrast    Result Date: 10/8/2023  CT HEAD WO CONTRAST Date of Exam: 10/8/2023 5:34 PM EDT Indication: fall, head injury. Comparison: None available. Technique: Axial CT images were obtained of the head without contrast administration.  Automated exposure control and iterative construction methods were used. Findings: There is  no evidence of hemorrhage. There is no mass effect or midline shift. There is no extracerebral collection. Ventricles are normal in size and configuration for patient's stated age. Posterior fossa is within normal limits. Calvarium and skull base appear intact.  Visualized sinuses show no air fluid levels. Visualized orbits are unremarkable.     Impression: No acute intracranial abnormality Electronically Signed: Manuel Butts MD  10/8/2023 5:47 PM EDT  Workstation ID: [id number]    XR Chest 1 View    Result Date: 10/8/2023  XR CHEST 1 VW Date of Exam: 10/8/2023 4:46 PM EDT Indication: Weak/Dizzy/AMS triage protocol Comparison: None available. Findings: The heart, mediastinum and pulmonary vasculature appear within normal limits. Hazy opacity of the lower chest is apparently due to overlying breast tissue shadow. Allowing for this, no evidence of active pulmonary parenchymal disease is seen. No pneumothorax, edema or effusion is seen. Bony structures appear to be intact.     Impression: Breast tissue shadow overlying the lower right and left chest. Allowing for this, no evidence of active disease. Electronically Signed: Tai Matson MD  10/8/2023 5:03 PM EDT  Workstation ID: [id number]                 Plan for Follow-up of Pending Labs/Results:     Discharge Details        Discharge Medications        New Medications        Instructions Start Date   levETIRAcetam 500 MG tablet  Commonly known as: KEPPRA   500 mg, Oral, Every 12 Hours Scheduled               No Known Allergies      Discharge Disposition:  Home or Self Care    Diet:  Hospital:  Diet Order   Procedures    Diet: Regular/House Diet; Texture: Regular Texture (IDDSI 7); Fluid Consistency: Thin (IDDSI 0)       Diet Instructions       Diet: Regular/House Diet; Thin (IDDSI 0)      Discharge Diet: Regular/House Diet    Fluid Consistency: Thin (IDDSI 0)             Activity:  Activity Instructions       Activity as Tolerated      Driving Restrictions      Type of  Restriction: Driving    Driving Restrictions: No Driving            Restrictions or Other Recommendations:         CODE STATUS:    Code Status and Medical Interventions:   Ordered at: 10/08/23 2106     Code Status (Patient has no pulse and is not breathing):    CPR (Attempt to Resuscitate)     Medical Interventions (Patient has pulse or is breathing):    Full Support       Future Appointments   Date Time Provider Department Center   11/21/2023 10:00 AM Yosvany Shelby MD [facility]       Additional Instructions for the Follow-ups that You Need to Schedule       Discharge Follow-up with Specified Provider: Dr. Shaver; 6 Weeks   As directed      To: Dr. Shaver   Follow Up: 6 Weeks                      Mely Castillo MD  10/09/23      Time Spent on Discharge:  I spent  20  minutes on this discharge activity which included: face-to-face encounter with the patient, reviewing the data in the system, coordination of the care with the nursing staff as well as consultants, documentation, and entering orders.

## 2023-10-09 NOTE — CONSULTS
"[facility] Neurology  Consult Note    Patient Name: Sade Jha  : 1975  MRN: [id number]  Primary Care Physician:  Provider, No Known  Referring Physician: No ref. provider found  Date of admission: 10/8/2023    Subjective     Reason for Consult/ Chief Complaint: Seizure    Sade Jha is a 48 y.o. female with past medical history of anxiety who presented to [facility] after concern of seizure-like activity.   Per patient report she has had approximately 3 of these episodes in the past month.  Her episodes always began with aura of an overall feeling of malaise.  She states she never fully loses consciousness with her events.  She states she can hear what is going on around her but feels \"paralyzed\".  The event yesterday was however different from previous episodes; she endorses jerking motions in her bilateral upper extremities.  She also endorses extreme lethargy lasting approximately 2 hours postevent.  She states that she feels her eyelids \"flutter\".  She denies any tongue bite, loss of bowel or bladder control.  Patient was given 1 g of Keppra in the ED and started on 500 twice daily.  She denies any recent medication changes.  She denies any drug use.  She only consumes alcohol socially 1-2 times per month.  She denies any past medical of seizure or family history of seizure.    She states that she had events like this approximately 5 years ago.  At that time she was seen by both a psychiatrist and a neurologist, with no findings.      Review Of Systems   Constitutional: Well-developed female  Cardiovascular: Negative for chest pain or palpitations.  Respiratory: Negative for shortness of breath or cough.  Gastrointestinal: Negative for nausea and vomiting.  Genitourinary: Negative for bladder incontinence.  Musculoskeletal: Negative for aches and pains in the muscles or joints.  Dermatological: Negative for skin breakdown.   Neurological: Negative for headache, pain, weakness or vision changes. "     Personal History     History reviewed. No pertinent past medical history.    Past Surgical History:   Procedure Laterality Date    BREAST SURGERY         Family History: family history is not on file. Otherwise pertinent FHx was reviewed and not pertinent to current issue.    Social History:  reports that she has never smoked. She has never used smokeless tobacco. She reports current alcohol use. She reports that she does not use drugs.    Home Medications:   levETIRAcetam    Current Medications:     Current Facility-Administered Medications:     sennosides-docusate (PERICOLACE) 8.6-50 MG per tablet 2 tablet, 2 tablet, Oral, BID **AND** polyethylene glycol (MIRALAX) packet 17 g, 17 g, Oral, Daily PRN **AND** bisacodyl (DULCOLAX) EC tablet 5 mg, 5 mg, Oral, Daily PRN **AND** bisacodyl (DULCOLAX) suppository 10 mg, 10 mg, Rectal, Daily PRN, Rashmi Hernandez MD    levETIRAcetam (KEPPRA) tablet 500 mg, 500 mg, Oral, Q12H, Natalee Lyons, APRN, 500 mg at 10/09/23 1123    LORazepam (ATIVAN) injection 1 mg, 1 mg, Intravenous, Once PRN, Rashmi Hernandez MD    sodium chloride 0.9 % flush 10 mL, 10 mL, Intravenous, PRN, [place],     sodium chloride 0.9 % flush 10 mL, 10 mL, Intravenous, Q12H, Rashmi Hernandez MD, 10 mL at 10/09/23 1009    sodium chloride 0.9 % flush 10 mL, 10 mL, Intravenous, PRN, Rashmi Hernandez MD    sodium chloride 0.9 % infusion 40 mL, 40 mL, Intravenous, PRN, Rashmi Hernandez MD     Allergies:  No Known Allergies    Objective     Physical Exam  Vitals and nursing note reviewed.   Eyes:      Pupils: Pupils are equal, round, and reactive to light.      Comments: Mild lateral nystagmus noted   Neurological:      Mental Status: She is alert and oriented to person, place, and time.      Cranial Nerves: Cranial nerves 2-12 are intact.      Sensory: Sensation is intact.      Motor: No weakness or seizure activity.      Coordination: Finger-Nose-Finger Test normal.      Gait: Gait is intact.       "Deep Tendon Reflexes: Reflexes are normal and symmetric. Babinski sign absent on the right side. Babinski sign absent on the left side.      Comments:     Cranial Nerves   CN II: Pupils are equal, round, and reactive to light. Normal visual acuity and visual fields.    CN III IV VI: Extraocular movements are full without nystagmus.  CN V: Normal facial sensation and strength of muscles of mastication.  CN VII: Facial movements are symmetric. No weakness.  CN VIII:  Auditory acuity is normal.  CN IX & X:  Symmetric palatal movement.  CN XI: Sternocleidomastoid and trapezius are normal.  No weakness.  CN XII: The tongue is midline.  No atrophy or fasciculations.            Vitals:  Temp:  [98 øF (36.7 øC)-99.2 øF (37.3 øC)] 98.2 øF (36.8 øC)  Heart Rate:  [63-77] 70  Resp:  [16-18] 18  BP: (101-150)/(63-89) 122/75    Laboratory Results:   Lab Results   Component Value Date    GLUCOSE 91 10/08/2023    CALCIUM 8.7 10/08/2023     (L) 10/08/2023    K 3.9 10/08/2023    CO2 20.0 (L) 10/08/2023     10/08/2023    BUN 12 10/08/2023    CREATININE 0.87 10/08/2023    BCR 13.8 10/08/2023    ANIONGAP 11.0 10/08/2023     Lab Results   Component Value Date    WBC 7.90 10/08/2023    HGB 12.0 10/08/2023    HCT 36.1 10/08/2023    MCV 95.3 10/08/2023     10/08/2023     No results found for: \"CHOL\"  Lab Results   Component Value Date    HDL 84 11/05/2021     Lab Results   Component Value Date    .6 (H) 11/05/2021     Lab Results   Component Value Date    TRIG 47 11/05/2021     No results found for: \"HGBA1C\"  No results found for: \"INR\", \"PROTIME\"  No results found for: \"NBLEMAVK68\"  No results found for: \"FOLATE\"    MRI Brain With & Without Contrast    Result Date: 10/9/2023  MRI BRAIN W WO CONTRAST Date of Exam: 10/9/2023 12:37 AM EDT Indication: Seizure, new-onset, no history of trauma.  Comparison: Head CT 10/8/2023. Technique:  Routine multiplanar/multisequence sequence images of the brain were obtained " before and after the uneventful administration of 10 mL Multihance. Findings: There is no diffusion restriction to suggest acute infarct. There is no evidence of acute or chronic intracranial hemorrhage.  No mass effect or midline shift. No abnormal extra-axial collections.  There are no significant signal abnormalities within the  brain parenchyma.  The major vascular flow voids appear intact.  The basal ganglia, brainstem and cerebellum appear within normal limits.  Calvarial and superficial soft tissue signal is within normal limits.  Orbits appear unremarkable.  The paranasal sinuses and the mastoid air cells appear well aerated.  Midline structures are intact.  There is no abnormal intracranial enhancement. There is a small right frontal developmental venous anomaly. There is normal enhancement within the intracranial vasculature including the dural venous sinuses. There is no evidence of gray matter heterotopia. No obvious cortical dysplasia. No temporal lobe lesions. The hippocampus appears normal bilaterally. No evidence of mesial temporal sclerosis.     Impression: Impression: No acute intracranial abnormality. No epileptogenic focus identified. Electronically Signed: Oskar Grant MD  10/9/2023 1:26 AM EDT  Workstation ID: [id number]      Assessment / Plan     Brief Patient Summary:  Sade Jha is a 48 y.o. female with past medical history of anxiety who presented to [facility] after concern of seizure-like activity.      EEG revealed normal study    MRI brain without contrast showed no acute abnormalities.  Did show a small right frontal venous development and normally.    CT head showed no acute abnormalities    Plan:   Seizure-like activity  Anxiety  Hard to distinguish if patient's symptoms are related to partial seizure activity or anxiety.  She has had 3 of these attacks in the past month.  We will continue Keppra.  Continue Keppra 500 mg twice daily  Continue home Paxil 10 mg daily  Patient was educated  not to drive or operate motor vehicle for the next 90 days post last seizure event.  Per [place] state law  Patient was educated on safe seizure practices such as not taking tub baths, not swimming, not climbing high heights.  Patient will follow-up with Dr. Shelby next available appointment  Patient has returned to baseline and is okay for discharge once deemed medically appropriate by the hospitalist.     I have discussed the above with the patient, bedside RN Dr. Castillo  Time spent with patient: 60 minutes in face-to-face evaluation and management of the patient.       Natalee Lyons, APRN

## 2023-10-09 NOTE — ACP (ADVANCE CARE PLANNING)
Assisted patient with completion of a living will.  Patient has named her spouse, Gavino Pope and one of her daughters, Keerthi Pope, as her health care surrogates.  If at EOL, patient does not wish to be kept alive by mechanical means nor does she want artificial nutrition or dehydration.  I notarized the document, placed a copy in her chart, faxed a copy to medical records and returned the original plus two copies to the patient.

## 2023-11-09 ENCOUNTER — OFFICE VISIT (OUTPATIENT)
Dept: NEUROLOGY | Facility: CLINIC | Age: 48
End: 2023-11-09
Payer: COMMERCIAL

## 2023-11-09 VITALS
WEIGHT: 122 LBS | OXYGEN SATURATION: 98 % | HEART RATE: 63 BPM | DIASTOLIC BLOOD PRESSURE: 78 MMHG | HEIGHT: 63 IN | SYSTOLIC BLOOD PRESSURE: 108 MMHG | BODY MASS INDEX: 21.62 KG/M2

## 2023-11-09 DIAGNOSIS — F41.9 ANXIETY AND DEPRESSION: ICD-10-CM

## 2023-11-09 DIAGNOSIS — F44.4 CONVERSION DISORDER WITH ABNORMAL MOVEMENT: Primary | ICD-10-CM

## 2023-11-09 DIAGNOSIS — F32.A ANXIETY AND DEPRESSION: ICD-10-CM

## 2023-11-09 RX ORDER — MULTIVITAMIN
1 CAPSULE ORAL DAILY
COMMUNITY
End: 2023-11-09

## 2023-11-09 NOTE — PROGRESS NOTES
Subjective:    CC: Sade Jha is seen today in consultation at the [facility] for Seizures       HPI:  48-year-old female accompanied by her  with a history of anxiety presents with seizure-like episodes.  As per patient she had her first seizure-like episode on October 4 when she felt numb all over as well as dizzy and had slumped over to 1 side.  Her second episode was on October 8 when she went to the bathroom and then fell down to the floor with shaking of both arms that lasted for about 5 minutes and eyes fluttering.  Denies any tongue bite or bowel/bladder incontinence.  She could hear what was going on but could not respond.  After that she was brought to [facility] where she had a CT head and MRI brain that did not show any significant chronic ischemic changes or acute intracranial abnormalities as well as an EEG that was unremarkable.  Her blood work showed a sodium of 135 but normal UA/U tox.  She was started on Keppra 500 mg twice daily that she has continued to take.  She had 2 more episodes yesterday.  With the first episode she was at a restaurant when she felt dizzy and numb all over.  After that her muscles felt tight and she could not move or speak for about 1 to 2 minutes.  Her second episode was later in the day when she felt numb followed by shaking and yelling telling her family members to make it stop.  She has been very stressed out recently as 2 of her kids have severe depression, daughter also has suicidal ideation.  She denies having any abnormal birth history other than forceps delivery, family history of seizures, concussions or febrile seizures as a child but states that she had several episodes in her 20s 1 year after the death of her father where she would become numb and limp not responding.  At one time she was having multiple episodes a day.  Saw cardiology and neurology at that time and was diagnosed with panic attacks.  After that she was put on Paxil which  "helped control her episodes but she did have some episodes after a few years as well.  Has not taken Paxil since 2019 as she was doing better.  Of note-Personally reviewed her MRI brain and hospital notes    The following portions of the patient's history were reviewed today and updated as of 11/09/2023  : allergies, current medications, past family history, past medical history, past social history, past surgical history, and problem list  These document will be scanned to patient's chart.      Current Outpatient Medications:     sertraline (Zoloft) 50 MG tablet, Take 1 tablet by mouth Daily., Disp: 30 tablet, Rfl: 6   History reviewed. No pertinent past medical history.   Past Surgical History:   Procedure Laterality Date    BREAST SURGERY        History reviewed. No pertinent family history.   Social History     Socioeconomic History    Marital status:    Tobacco Use    Smoking status: Never     Passive exposure: Never    Smokeless tobacco: Never   Vaping Use    Vaping Use: Never used   Substance and Sexual Activity    Alcohol use: Yes     Comment: 1-2 times per month    Drug use: Never     Review of Systems   Neurological:  Positive for seizures.   All other systems reviewed and are negative.    Objective:    /78   Pulse 63   Ht 160 cm (63\")   Wt 55.3 kg (122 lb)   SpO2 98%   BMI 21.61 kg/m²     Neurology Exam:    General apperance: NAD.  Extremely emotional    Mental status: Alert, awake and oriented to time place and person.    Recent and Remote memory: Intact.    Attention span and Concentration: Normal.     Language and Speech: Intact- No dysarthria.    Fluency, Naming , Repitition and Comprehension:  Intact    Cranial Nerves:   CN II: Visual fields are full. Intact. Fundi - Normal, No papillederma, Pupils - ANKITA  CN III, IV and VI: Extraocular movements are intact. Normal saccades.   CN V: Facial sensation is intact.   CN VII: Muscles of facial expression reveal no asymmetry. Intact.   CN " VIII: Hearing is intact. Whispered voice intact.   CN IX and X: Palate elevates symmetrically. Intact  CN XI: Shoulder shrug is intact.   CN XII: Tongue is midline without evidence of atrophy or fasciculation.     Ophthalmoscopic exam of optic disc-normal    Motor:  Right UE muscle strength 5/5. Normal tone.     Left UE muscle strength 5/5. Normal tone.      Right LE muscle strength5/5. Normal tone.     Left LE muscle strength 5/5. Normal tone.      Sensory: Normal light touch, vibration and pinprick sensation bilaterally.    DTRs: 2+ bilaterally in upper and lower extremities.    Babinski: Negative bilaterally.    Co-ordination: Normal finger-to-nose, heel to shin B/L.    Rhomberg: Negative.    Gait: Normal.    Cardiovascular: Regular rate and rhythm without murmur, gallop or rub.    Assessment and Plan:  1. Conversion disorder with abnormal movement  The episodes that she has had now and in the past seem fairly typical for PNES or psychogenic nonepileptic spells that can be caused by stress.  Both MRI brain and EEG were unremarkable  I discussed this in detail with her and have also given her literature to read about it  She can gradually taper and stop Keppra  Instead I will start her on Zoloft gradually increasing to 50 mg nightly and give her referral for psychotherapy/counseling  Counseled on regular seizure precautions including no driving till these episodes are well controlled    - Ambulatory Referral to Psychotherapy    2. Anxiety and depression    - Ambulatory Referral to Psychotherapy       Return in about 5 months (around 4/9/2024).     I spent over 45 minutes with the patient face to face out of which over 50% (30 minutes) was spent in management, instructions and education.     Eli Shaver MD

## 2024-02-07 NOTE — TELEPHONE ENCOUNTER
Rx Refill Note  Requested Prescriptions     Pending Prescriptions Disp Refills    sertraline (Zoloft) 50 MG tablet 30 tablet 6     Sig: Take 1 tablet by mouth Daily.      Last filled:11/09/23  Last office visit with prescribing clinician: 11/9/2023      Next office visit with prescribing clinician: 4/9/2024     Terri Matos MA  02/07/24, 16:16 EST    Last rx was sent in for 30 day supply and they are requesting a 90 day supply.  Sent in 90 day supply with 0 refills.

## 2024-04-09 ENCOUNTER — OFFICE VISIT (OUTPATIENT)
Dept: NEUROLOGY | Facility: CLINIC | Age: 49
End: 2024-04-09
Payer: COMMERCIAL

## 2024-04-09 VITALS
HEART RATE: 62 BPM | BODY MASS INDEX: 22.57 KG/M2 | SYSTOLIC BLOOD PRESSURE: 128 MMHG | WEIGHT: 127.4 LBS | OXYGEN SATURATION: 98 % | DIASTOLIC BLOOD PRESSURE: 86 MMHG | HEIGHT: 63 IN

## 2024-04-09 DIAGNOSIS — R56.9 SEIZURE: Primary | ICD-10-CM

## 2024-04-09 PROCEDURE — 99214 OFFICE O/P EST MOD 30 MIN: CPT | Performed by: PSYCHIATRY & NEUROLOGY

## 2024-04-09 RX ORDER — MELOXICAM 15 MG/1
TABLET ORAL
COMMUNITY
Start: 2024-03-31

## 2024-04-09 NOTE — PROGRESS NOTES
"Subjective:    CC: Sade Jha is seen today for Follow-up       Current visit-patient states that she had another episode of shaking 1 month ago.  With that episode she was with a friend when she started to have numbness all over with feeling weird.  After that she tried to divert her mind by counting however that did not work because soon after that she fell down and started to have whole body shaking that lasted for about 2 to 3 minutes.  Once again she could hear her friend talk to her but could not respond.  She was tired and dizzy afterwards.  Denies any tongue bite or incontinence.  She started to take Zoloft but stopped taking it after this episode as she felt it was not helping.  She also started to see a therapist a few times but was discharged as she was told she does not really have any significant symptoms of anxiety or depression.  She tells me today that she had another seizure-like episode with shaking 5 years ago when she had an EEG and was told \"the new ones on the right side and not working as well as the left \".  After that she was started on oxcarbazepine that she took for about 2 years.  Did not have any more spells till October of last year.    Of note-I once again reviewed her MRI brain done last year with her.  Even though it was read as normal it does show mild right hippocampal atrophy as compared to the left.    Initial [id number]-old female accompanied by her  with a history of anxiety presents with seizure-like episodes.  As per patient she had her first seizure-like episode on October 4 when she felt numb all over as well as dizzy and had slumped over to 1 side.  Her second episode was on October 8 when she went to the bathroom and then fell down to the floor with shaking of both arms that lasted for about 5 minutes and eyes fluttering.  Denies any tongue bite or bowel/bladder incontinence.  She could hear what was going on but could not respond.  After that she was brought " to [facility] where she had a CT head and MRI brain that did not show any significant chronic ischemic changes or acute intracranial abnormalities as well as an EEG that was unremarkable.  Her blood work showed a sodium of 135 but normal UA/U tox.  She was started on Keppra 500 mg twice daily that she has continued to take.  She had 2 more episodes yesterday.  With the first episode she was at a restaurant when she felt dizzy and numb all over.  After that her muscles felt tight and she could not move or speak for about 1 to 2 minutes.  Her second episode was later in the day when she felt numb followed by shaking and yelling telling her family members to make it stop.  She has been very stressed out recently as 2 of her kids have severe depression, daughter also has suicidal ideation.  She denies having any abnormal birth history other than forceps delivery, family history of seizures, concussions or febrile seizures as a child but states that she had several episodes in her 20s 1 year after the death of her father where she would become numb and limp not responding.  At one time she was having multiple episodes a day.  Saw cardiology and neurology at that time and was diagnosed with panic attacks.  After that she was put on Paxil which helped control her episodes but she did have some episodes after a few years as well.  Has not taken Paxil since 2019 as she was doing better.  Of note-Personally reviewed her MRI brain and hospital notes    The following portions of the patient's history were reviewed today and updated as of 11/09/2023  : allergies, current medications, past family history, past medical history, past social history, past surgical history, and problem list  These document will be scanned to patient's chart.      Current Outpatient Medications:     meloxicam (MOBIC) 15 MG tablet, , Disp: , Rfl:     sertraline (Zoloft) 50 MG tablet, Take 1 tablet by mouth Daily., Disp: 90 tablet, Rfl: 0   Past  "Medical History:   Diagnosis Date    Migraine     Seizures     Vision loss       Past Surgical History:   Procedure Laterality Date    BREAST SURGERY        Family History   Problem Relation Age of Onset    Migraines Mother     Stroke Mother     Parkinsonism Maternal Grandmother     Migraines Maternal Aunt     Parkinsonism Maternal Aunt     Migraines Maternal Aunt     Migraines Maternal Uncle     Migraines Maternal Uncle       Social History     Socioeconomic History    Marital status:    Tobacco Use    Smoking status: Never     Passive exposure: Never    Smokeless tobacco: Never   Vaping Use    Vaping status: Never Used   Substance and Sexual Activity    Alcohol use: Yes     Comment: 1-2 times per month    Drug use: Never    Sexual activity: Not Currently     Partners: Male     Birth control/protection: Natural family planning/Rhythm     Review of Systems   Neurological:  Positive for seizures.   All other systems reviewed and are negative.      Objective:    /86   Pulse 62   Ht 160 cm (63\")   Wt 57.8 kg (127 lb 6.4 oz)   SpO2 98%   BMI 22.57 kg/m²     Neurology Exam:    General apperance: NAD.  Extremely emotional    Mental status: Alert, awake and oriented to time place and person.    Recent and Remote memory: Intact.    Attention span and Concentration: Normal.     Language and Speech: Intact- No dysarthria.    Fluency, Naming , Repitition and Comprehension:  Intact    Cranial Nerves:   CN II: Visual fields are full. Intact. Fundi - Normal, No papillederma, Pupils - ANKITA  CN III, IV and VI: Extraocular movements are intact. Normal saccades.   CN V: Facial sensation is intact.   CN VII: Muscles of facial expression reveal no asymmetry. Intact.   CN VIII: Hearing is intact. Whispered voice intact.   CN IX and X: Palate elevates symmetrically. Intact  CN XI: Shoulder shrug is intact.   CN XII: Tongue is midline without evidence of atrophy or fasciculation.     Ophthalmoscopic exam of optic " disc-normal    Motor:  Right UE muscle strength 5/5. Normal tone.     Left UE muscle strength 5/5. Normal tone.      Right LE muscle strength5/5. Normal tone.     Left LE muscle strength 5/5. Normal tone.      Sensory: Normal light touch, vibration and pinprick sensation bilaterally.    DTRs: 2+ bilaterally in upper and lower extremities.    Babinski: Negative bilaterally.    Co-ordination: Normal finger-to-nose, heel to shin B/L.    Rhomberg: Negative.    Gait: Normal.    Cardiovascular: Regular rate and rhythm without murmur, gallop or rub.    Assessment and Plan:  1.  Seizure  I will get another prolonged video EEG because she tells me today that a previous EEG done in [place] showed some right hemispheric abnormalities.  Based on her episode semiology I do feel she could have PNES (she had spells in her 20s as well that stopped after a few years).  Routine EEG was normal.  MRI brain did show mild right hippocampal atrophy as compared to the left  Counseled on regular seizure precautions including no driving till these episodes are well controlled         Return in about 3 months (around 7/9/2024).     I spent over 35 minutes with the patient face to face out of which over 50% (30 minutes) was spent in management, instructions and education.     Eli Shaver MD

## 2024-06-05 ENCOUNTER — HOSPITAL ENCOUNTER (OUTPATIENT)
Dept: NEUROLOGY | Facility: HOSPITAL | Age: 49
Discharge: HOME | End: 2024-06-05
Admitting: PSYCHIATRY & NEUROLOGY
Payer: COMMERCIAL

## 2024-06-05 DIAGNOSIS — R56.9 SEIZURE: ICD-10-CM

## 2024-06-05 PROCEDURE — 95713 VEEG 2-12 HR CONT MNTR: CPT

## 2024-06-10 NOTE — TELEPHONE ENCOUNTER
Rx Refill Note  Requested Prescriptions     Pending Prescriptions Disp Refills    sertraline (ZOLOFT) 50 MG tablet [Pharmacy Med Name: SERTRALINE HCL 50 MG TABLET] 90 tablet 0     Sig: TAKE 1 TABLET BY MOUTH EVERY DAY      Last filled:2/7/24 0 refill  Last office visit with prescribing clinician: 4/9/2024      Next office visit with prescribing clinician: 7/25/2024     ROSEANNA DENISE  06/10/24, 13:33 EDT    Sent in 60ds until upcoming appointment.

## 2024-07-25 ENCOUNTER — OFFICE VISIT (OUTPATIENT)
Dept: NEUROLOGY | Facility: CLINIC | Age: 49
End: 2024-07-25
Payer: COMMERCIAL

## 2024-07-25 VITALS
BODY MASS INDEX: 22.15 KG/M2 | WEIGHT: 125 LBS | DIASTOLIC BLOOD PRESSURE: 80 MMHG | SYSTOLIC BLOOD PRESSURE: 120 MMHG | HEART RATE: 68 BPM | HEIGHT: 63 IN

## 2024-07-25 DIAGNOSIS — F44.4 CONVERSION DISORDER WITH ABNORMAL MOVEMENT: Primary | ICD-10-CM

## 2024-07-25 PROCEDURE — 99213 OFFICE O/P EST LOW 20 MIN: CPT | Performed by: PSYCHIATRY & NEUROLOGY

## 2024-07-25 RX ORDER — HYDROXYCHLOROQUINE SULFATE 200 MG/1
200 TABLET ORAL DAILY
COMMUNITY
Start: 2024-04-23

## 2024-07-25 NOTE — PROGRESS NOTES
"Subjective:    CC: Sade Jha is seen today for Seizures       Current visit-patient denies having any seizure-like episodes since her last visit.  Her last episode was in March.  She had her long-term video EEG that was normal.  She states mentally she is in a much better place as her family issues have resolved.  She has also started exercising regularly.  Does complain of diffuse joint pains.  Has been started on Plaquenil which seems to be helping even though her antibody/rheumatoid test was neck.      Last visit-patient states that she had another episode of shaking 1 month ago.  With that episode she was with a friend when she started to have numbness all over with feeling weird.  After that she tried to divert her mind by counting however that did not work because soon after that she fell down and started to have whole body shaking that lasted for about 2 to 3 minutes.  Once again she could hear her friend talk to her but could not respond.  She was tired and dizzy afterwards.  Denies any tongue bite or incontinence.  She started to take Zoloft but stopped taking it after this episode as she felt it was not helping.  She also started to see a therapist a few times but was discharged as she was told she does not really have any significant symptoms of anxiety or depression.  She tells me today that she had another seizure-like episode with shaking 5 years ago when she had an EEG and was told \"the new ones on the right side and not working as well as the left \".  After that she was started on oxcarbazepine that she took for about 2 years.  Did not have any more spells till October of last year.    Of note-I once again reviewed her MRI brain done last year with her.  Even though it was read as normal it does show mild right hippocampal atrophy as compared to the left.    Initial [id number]-old female accompanied by her  with a history of anxiety presents with seizure-like episodes.  As per patient " she had her first seizure-like episode on October 4 when she felt numb all over as well as dizzy and had slumped over to 1 side.  Her second episode was on October 8 when she went to the bathroom and then fell down to the floor with shaking of both arms that lasted for about 5 minutes and eyes fluttering.  Denies any tongue bite or bowel/bladder incontinence.  She could hear what was going on but could not respond.  After that she was brought to [facility] where she had a CT head and MRI brain that did not show any significant chronic ischemic changes or acute intracranial abnormalities as well as an EEG that was unremarkable.  Her blood work showed a sodium of 135 but normal UA/U tox.  She was started on Keppra 500 mg twice daily that she has continued to take.  She had 2 more episodes yesterday.  With the first episode she was at a restaurant when she felt dizzy and numb all over.  After that her muscles felt tight and she could not move or speak for about 1 to 2 minutes.  Her second episode was later in the day when she felt numb followed by shaking and yelling telling her family members to make it stop.  She has been very stressed out recently as 2 of her kids have severe depression, daughter also has suicidal ideation.  She denies having any abnormal birth history other than forceps delivery, family history of seizures, concussions or febrile seizures as a child but states that she had several episodes in her 20s 1 year after the death of her father where she would become numb and limp not responding.  At one time she was having multiple episodes a day.  Saw cardiology and neurology at that time and was diagnosed with panic attacks.  After that she was put on Paxil which helped control her episodes but she did have some episodes after a few years as well.  Has not taken Paxil since 2019 as she was doing better.  Of note-Personally reviewed her MRI brain and hospital notes    The following portions of the  "patient's history were reviewed today and updated as of 11/09/2023  : allergies, current medications, past family history, past medical history, past social history, past surgical history, and problem list  These document will be scanned to patient's chart.      Current Outpatient Medications:     Plaquenil 200 MG tablet, Take 1 tablet by mouth Daily., Disp: , Rfl:    Past Medical History:   Diagnosis Date    Migraine     Seizures     Vision loss       Past Surgical History:   Procedure Laterality Date    BREAST SURGERY        Family History   Problem Relation Age of Onset    Migraines Mother     Stroke Mother     Parkinsonism Maternal Grandmother     Migraines Maternal Aunt     Parkinsonism Maternal Aunt     Migraines Maternal Aunt     Migraines Maternal Uncle     Migraines Maternal Uncle       Social History     Socioeconomic History    Marital status:    Tobacco Use    Smoking status: Never     Passive exposure: Never    Smokeless tobacco: Never   Vaping Use    Vaping status: Never Used   Substance and Sexual Activity    Alcohol use: Yes     Comment: 1-2 times per month    Drug use: Never    Sexual activity: Not Currently     Partners: Male     Birth control/protection: Natural family planning/Rhythm     Review of Systems   Neurological:  Positive for seizures.   All other systems reviewed and are negative.      Objective:    /80   Pulse 68   Ht 160 cm (62.99\")   Wt 56.7 kg (125 lb)   BMI 22.15 kg/m²     Neurology Exam:    General apperance: NAD.  Extremely emotional    Mental status: Alert, awake and oriented to time place and person.    Recent and Remote memory: Intact.    Attention span and Concentration: Normal.     Language and Speech: Intact- No dysarthria.    Fluency, Naming , Repitition and Comprehension:  Intact    Cranial Nerves:   CN II: Visual fields are full. Intact. Fundi - Normal, No papillederma, Pupils - ANKITA  CN III, IV and VI: Extraocular movements are intact. Normal saccades. "   CN V: Facial sensation is intact.   CN VII: Muscles of facial expression reveal no asymmetry. Intact.   CN VIII: Hearing is intact. Whispered voice intact.   CN IX and X: Palate elevates symmetrically. Intact  CN XI: Shoulder shrug is intact.   CN XII: Tongue is midline without evidence of atrophy or fasciculation.     Ophthalmoscopic exam of optic disc-normal    Motor:  Right UE muscle strength 5/5. Normal tone.     Left UE muscle strength 5/5. Normal tone.      Right LE muscle strength5/5. Normal tone.     Left LE muscle strength 5/5. Normal tone.      Sensory: Normal light touch, vibration and pinprick sensation bilaterally.    DTRs: 2+ bilaterally in upper and lower extremities.    Babinski: Negative bilaterally.    Co-ordination: Normal finger-to-nose, heel to shin B/L.    Rhomberg: Negative.    Gait: Normal.    Cardiovascular: Regular rate and rhythm without murmur, gallop or rub.    Assessment and Plan:  1.  Conversion disorder   Based on her episode semiology I do feel she could have PNES (she had spells in her 20s as well that stopped after a few years).  Both routine and long-term video EEG were normal.  MRI brain did show mild right hippocampal atrophy as compared to the left  I have counseled her on certain techniques that she can try if she feels an episode coming on  She can drive now as her last episode was over 3 months ago         Return in about 1 year (around 7/25/2025).       Eli Shaver MD

## 2024-08-06 ENCOUNTER — OFFICE VISIT (OUTPATIENT)
Age: 49
End: 2024-08-06
Payer: COMMERCIAL

## 2024-08-06 VITALS
WEIGHT: 125.5 LBS | TEMPERATURE: 98 F | HEIGHT: 63 IN | SYSTOLIC BLOOD PRESSURE: 122 MMHG | BODY MASS INDEX: 22.24 KG/M2 | DIASTOLIC BLOOD PRESSURE: 76 MMHG | HEART RATE: 71 BPM

## 2024-08-06 DIAGNOSIS — M19.041 PRIMARY OSTEOARTHRITIS OF BOTH HANDS: ICD-10-CM

## 2024-08-06 DIAGNOSIS — R76.8 ANA POSITIVE: ICD-10-CM

## 2024-08-06 DIAGNOSIS — I73.00 RAYNAUD'S DISEASE WITHOUT GANGRENE: ICD-10-CM

## 2024-08-06 DIAGNOSIS — Z87.898 HISTORY OF SEIZURE: ICD-10-CM

## 2024-08-06 DIAGNOSIS — M32.9 SYSTEMIC LUPUS ERYTHEMATOSUS, UNSPECIFIED SLE TYPE, UNSPECIFIED ORGAN INVOLVEMENT STATUS: Primary | ICD-10-CM

## 2024-08-06 DIAGNOSIS — M19.042 PRIMARY OSTEOARTHRITIS OF BOTH HANDS: ICD-10-CM

## 2024-08-06 DIAGNOSIS — Z79.899 HIGH RISK MEDICATION USE: ICD-10-CM

## 2024-08-06 PROCEDURE — 99214 OFFICE O/P EST MOD 30 MIN: CPT | Performed by: INTERNAL MEDICINE

## 2024-08-06 RX ORDER — HYDROXYCHLOROQUINE SULFATE 200 MG/1
200 TABLET ORAL DAILY
Qty: 90 TABLET | Refills: 1 | Status: SHIPPED | OUTPATIENT
Start: 2024-08-06

## 2024-08-06 NOTE — PATIENT INSTRUCTIONS
Hydroxychloroquine Tablets    What is this medication?  HYDROXYCHLOROQUINE (inna drox ee KLOR oh kwin) treats autoimmune conditions, such as rheumatoid arthritis and lupus. It works by slowing down an overactive immune system. It may also be used to prevent and treat malaria. It works by killing the parasite that causes malaria. It belongs to a group of medications called DMARDs.  This medicine may be used for other purposes; ask your health care provider or pharmacist if you have questions.  COMMON BRAND NAME(S): Plaquenil, Quineprox    What should I tell my care team before I take this medication?  They need to know if you have any of these conditions:  Diabetes  Eye disease, vision problems  Frequently drink alcohol  G6PD deficiency  Heart disease  Irregular heartbeat or rhythm  Kidney disease  Liver disease  Porphyria  Psoriasis  An unusual or allergic reaction to hydroxychloroquine, other medications, foods, dyes, or preservatives  Pregnant or trying to get pregnant  Breastfeeding    How should I use this medication?  Take this medication by mouth with water. Take it as directed on the prescription label. Do not cut, crush, or chew this medication. Swallow the tablets whole. Take it with food. Do not take it more than directed. Take all of this medication unless your care team tells you to stop it early. Keep taking it even if you think you are better.  Take products with antacids in them at a different time of day than this medication. Take this medication 4 hours before or 4 hours after antacids. Talk to your care team if you have questions.  Talk to your care team about the use of this medication in children. While this medication may be prescribed for selected conditions, precautions do apply.  Overdosage: If you think you have taken too much of this medicine contact a poison control center or emergency room at once.  NOTE: This medicine is only for you. Do not share this medicine with others.    What if I  miss a dose?  If you miss a dose, take it as soon as you can. If it is almost time for your next dose, take only that dose. Do not take double or extra doses.    What may interact with this medication?  Do not take this medication with any of the following:  Cisapride  Dronedarone  Pimozide  Thioridazine  This medication may also interact with the following:  Ampicillin  Antacids  Cimetidine  Cyclosporine  Digoxin  Kaolin  Medications for diabetes, such as insulin, glipizide, glyburide  Medications for seizures, such as carbamazepine, phenobarbital, phenytoin  Mefloquine  Methotrexate  Other medications that cause heart rhythm changes  Praziquantel  This list may not describe all possible interactions. Give your health care provider a list of all the medicines, herbs, non-prescription drugs, or dietary supplements you use. Also tell them if you smoke, drink alcohol, or use illegal drugs. Some items may interact with your medicine.    What should I watch for while using this medication?  Visit your care team for regular checks on your progress. Tell your care team if your symptoms do not start to get better or if they get worse.  You may need blood work done while you are taking this medication. If you take other medications that can affect heart rhythm, you may need more testing. Talk to your care team if you have questions.  Your vision may be tested before and during use of this medication. Tell your care team right away if you have any change in your eyesight.  This medication may cause serious skin reactions. They can happen weeks to months after starting the medication. Contact your care team right away if you notice fevers or flu-like symptoms with a rash. The rash may be red or purple and then turn into blisters or peeling of the skin. Or, you might notice a red rash with swelling of the face, lips or lymph nodes in your neck or under your arms.  If you or your family notice any changes in your behavior, such  as new or worsening depression, thoughts of harming yourself, anxiety, or other unusual or disturbing thoughts, or memory loss, call your care team right away.    What side effects may I notice from receiving this medication?  Side effects that you should report to your care team as soon as possible:  Allergic reactions--skin rash, itching, hives, swelling of the face, lips, tongue, or throat  Aplastic anemia--unusual weakness or fatigue, dizziness, headache, trouble breathing, increased bleeding or bruising  Change in vision  Heart rhythm changes--fast or irregular heartbeat, dizziness, feeling faint or lightheaded, chest pain, trouble breathing  Infection--fever, chills, cough, or sore throat  Low blood sugar (hypoglycemia)--tremors or shaking, anxiety, sweating, cold or clammy skin, confusion, dizziness, rapid heartbeat  Muscle injury--unusual weakness or fatigue, muscle pain, dark yellow or brown urine, decrease in amount of urine  Pain, tingling, or numbness in the hands or feet  Rash, fever, and swollen lymph nodes  Redness, blistering, peeling, or loosening of the skin, including inside the mouth  Thoughts of suicide or self-harm, worsening mood, or feelings of depression  Unusual bruising or bleeding  Side effects that usually do not require medical attention (report to your care team if they continue or are bothersome):  Diarrhea  Headache  Nausea  Stomach pain  Vomiting  This list may not describe all possible side effects. Call your doctor for medical advice about side effects. You may report side effects to FDA at 9-748-FDA-3853.    Where should I keep my medication?  Keep out of the reach of children and pets.  Store at room temperature up to 30 degrees C (86 degrees F). Protect from light. Get rid of any unused medication after the expiration date.  To get rid of medications that are no longer needed or have :  Take the medication to a medication take-back program. Check with your pharmacy or  law enforcement to find a location.  If you cannot return the medication, check the label or package insert to see if the medication should be thrown out in the garbage or flushed down the toilet. If you are not sure, ask your care team. If it is safe to put it in the trash, empty the medication out of the container. Mix the medication with cat litter, dirt, coffee grounds, or other unwanted substance. Seal the mixture in a bag or container. Put it in the trash.  NOTE: This sheet is a summary. It may not cover all possible information. If you have questions about this medicine, talk to your doctor, pharmacist, or health care provider.  © 2024 [employer]/Gold Standard (2023-06-26 00:00:00) Systemic Lupus Erythematosus, Adult    Systemic lupus erythematosus (SLE) is a long-term (chronic) disease that can affect many parts of the body. SLE is an autoimmune disease. With this type of disease, the body's defense system (immune system) mistakenly attacks healthy tissues. This can cause damage to the skin, joints, blood vessels, brain, kidneys, lungs, heart, and other internal organs. It causes pain, irritation, and inflammation.    What are the causes?  The cause of this condition is not known.    What increases the risk?    The following factors may make you more likely to develop this condition:  Being female.  Being of , , or  descent.  Having a family history of the condition.  Being exposed to tobacco smoke or smoking cigarettes.  Having an infection with a virus, such as [place]-B[place] virus.  Having a history of exposure to silica dust, metals, chemicals, mold or mildew, or insecticides.  Using oral contraceptives or hormone replacement therapy.    What are the signs or symptoms?    This condition can affect almost any organ or system in the body. Symptoms of the condition depend on which organ or system is affected.    The most common symptoms include:  Fever.  Tiredness (fatigue).  Weight  loss.  Muscle aches.  Joint pain.  Skin rashes, especially over the nose and cheeks (butterfly rash) and after sun exposure.    Symptoms can come and go. A period of time when symptoms get worse or come back is called a flare. A period of time with no symptoms is called a remission.    How is this diagnosed?    This condition is diagnosed based on:  Your symptoms.  Your medical history.  A physical exam.    You may also have tests, including:  Blood tests.  Urine tests.  A chest X-ray.    You may be referred to an autoimmune disease specialist (rheumatologist).    How is this treated?    There is no cure for this condition, but treatment can help to control symptoms, prevent flares (keep symptoms in remission), and prevent damage to the heart, lungs, kidneys, and other organs.     Treatment will depend on what symptoms you are having and what organs or systems are affected. Treatment may involve taking a combination of medicines over time.    Common medicines used to treat this condition include:  Antimalarial medicines to control symptoms, prevent flares, and protect against organ damage.  Corticosteroids and NSAIDs to reduce inflammation.  Medicines to weaken your immune system (immunosuppressants).  Biologic response modifiers to reduce inflammation and damage.    Follow these instructions at home:    Medicines  Take over-the-counter and prescription medicines only as told by your health care provider.  Do not take any medicines that contain estrogen without first checking with your health care provider. Estrogen can trigger flares and may increase your risk for blood clots.    Eating and drinking  Eat a heart-healthy diet. This may include:  Eating high-fiber foods, such as beans, whole grains, and fresh fruits and vegetables.  Eating heart-healthy fats (omega-3 fats), such as fish, flaxseed, and flaxseed oil.  Limiting foods that are high in saturated fat and cholesterol, such as processed and fried foods,  fatty meat, and full-fat dairy.  Limiting how much salt (sodium) you eat.  Include calcium and vitamin D in your diet. Good sources of calcium and vitamin D include:  Low-fat dairy products such as milk, yogurt, and cheese.  Certain fish, such as fresh or canned salmon, tuna, and sardines.  Products that have calcium and vitamin D added to them (are fortified), such as fortified cereals or juice.    Lifestyle         Stay active, as directed by your health care provider.  Do not use any products that contain nicotine or tobacco. These products include cigarettes, chewing tobacco, and vaping devices, such as e-cigarettes. If you need help quitting, ask your health care provider.  Protect your skin from the sun by applying sunblock and wearing protective hats and clothing.  Learn as much as you can about your condition and have a good support system in place. Support may come from family, friends, or a lupus support group.    General instructions  Work closely with all of your health care providers to manage your condition.  Stay up to date on all vaccines as told by your health care provider.  Keep all follow-up visits. This is important.    Contact a health care provider if:  You have a fever.  Your symptoms flare.  You develop new symptoms.  You have an upper respiratory infection.  You have bloody, foamy, or coffee-colored urine.  There are changes in your urination. For example, you urinate more often at night.  You think that you may be depressed or have anxiety.  You become pregnant or plan to become pregnant. Pregnancy in women with this condition is considered high risk.    Get help right away if:  You have chest pain.  You have trouble breathing.  You have a seizure.  You suddenly get a very bad headache.  You suddenly develop facial or body weakness.  You cannot speak.  You cannot understand speech.  These symptoms may be an emergency. Get help right away. Call 911.  Do not wait to see if the symptoms will  go away.  Do not drive yourself to the hospital.    Summary  Systemic lupus erythematosus (SLE) is a long-term disease that can affect many parts of the body.  SLE is an autoimmune disease. That means your body's defense system (immune system) mistakenly attacks healthy tissues.  There is no cure for this condition, but treatment can help to control symptoms, prevent flares, and prevent damage to your organs. Treatment may involve taking a combination of medicines over time.    This information is not intended to replace advice given to you by your health care provider. Make sure you discuss any questions you have with your health care provider.    Document Revised: 09/23/2022 Document Reviewed: 09/23/2022  [email or web address] Patient Education © 2024 [email or web address] Antinuclear Antibody Test    Why am I having this test?  This is a test that is used to help diagnose systemic lupus erythematosus (SLE) and other autoimmune diseases. An autoimmune disease is a disease in which the body's own defense system (immune system) attacks its organs.  What is being tested?  This test checks for antinuclear antibodies (EDMUNDO) in the blood. The presence of EDMUNDO is associated with several autoimmune diseases. It is seen in almost all people with lupus.  What kind of sample is taken?    A blood sample is required for this test. It is usually collected by inserting a needle into a blood vessel.  How are the results reported?  Your test results will be reported as either positive or negative.  What do the results mean?  A positive test result may mean that you have:  Lupus.  Other autoimmune diseases, such as rheumatoid arthritis, scleroderma, or Sjögren syndrome.  Talk with your health care provider about what your results mean. In some cases, your health care provider may do more testing to confirm the results. More testing may be done because other conditions can sometimes cause a positive result, such as:  Liver dysfunction.  Myasthenia  gravis.  Infectious mononucleosis.  Questions to ask your health care provider  Ask your health care provider, or the department that is doing the test:  When will my results be ready?  How will I get my results?  What are my treatment options?  What other tests do I need?  What are my next steps?  Summary  This is a test that is used to help diagnose systemic lupus erythematosus (SLE) and other autoimmune diseases. An autoimmune disease is a disease in which the body's own defense system (immune system) attacks the body.  This test checks for antinuclear antibodies (EDMUNDO) in the blood. The presence of EDMUNDO is associated with several autoimmune diseases. It is seen in almost all people with lupus.  Your test results will be reported as either positive or negative. Talk with your health care provider about what your results mean.  This information is not intended to replace advice given to you by your health care provider. Make sure you discuss any questions you have with your health care provider.  Document Revised: 08/21/2022 Document Reviewed: 08/21/2022  Elsevier Patient Education © 2024 [employer]

## 2024-08-06 NOTE — ASSESSMENT & PLAN NOTE
Plaquenil    Well-tolerated and effective   I discussed the side effects of hydroxychloroquine including but not limited to GI upset, rash, photosensitivity, Hematologic and liver abnormalities and ocular abnormalities and need for frequent eye exam for toxicity monitoring.

## 2024-08-06 NOTE — ASSESSMENT & PLAN NOTE
"Oncology Rooming Note    January 20, 2022 4:29 PM   Libby Grimaldo is a 70 year old female who presents for:    Chief Complaint   Patient presents with     Oncology Clinic Visit     multiple myeloma     Initial Vitals: /72   Pulse 78   Temp 98.2  F (36.8  C) (Oral)   Ht 1.61 m (5' 3.39\")   Wt 62.6 kg (138 lb 0.1 oz)   SpO2 98%   BMI 24.15 kg/m   Estimated body mass index is 24.15 kg/m  as calculated from the following:    Height as of this encounter: 1.61 m (5' 3.39\").    Weight as of this encounter: 62.6 kg (138 lb 0.1 oz). Body surface area is 1.67 meters squared.  No Pain (0) Comment: Data Unavailable   No LMP recorded. Patient is postmenopausal.  Allergies reviewed: Yes  Medications reviewed: Yes    Medications: Medication refills not needed today.  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  Mother with vasculitis  Consult 12/13/23 from Dr. Art [facility] inflammatory arthritis hands  Pain and swelling bilateral hands since May 2023 after a fall  Dr. Art injected steroid right hand 3rd 4th PIP joints 10/31/23  Labs 10/31/23:  +EDMUNDO 1:80, negative rheumatoid factor/ACPA/sed rate/CRP/uric acid  Labs 4/12/2024: +EDMUNDO 1:40, negative TRINITY panel, negative lupus anticoagulant/cardiolipin antibody/beta-2 glycoprotein, negative urinalysis, negative RF/anti-CCP antibody, normal CBC except for mildly elevated platelets, normal CMP  **Current: Plaquenil 4/12/24  Prior meloxicam    Possible systemic lupus based on intermittent malar rash, oral ulcers, inflammatory arthritis, Raynaud's, history of seizures, positive EDMUNDO.  There is family history of lupus in a cousin  This could also be possible seronegative RA as well as    -Overall low disease activity today.  No swollen joints.    She reports remarkable improvement since starting on Plaquenil 200 mg once daily April 2024.  Plaquenil well-tolerated.  Strong encourage Plaquenil eye exam for monitoring  Labs ordered today for monitoring as below   -Recommend she follow-up with [facility] orthopedics Dr Art for intermittent numbness and tingling arms that could be from carpal tunnel syndrome.  Try carpal tunnel wrist splints at night.  Return to clinic 4 months

## 2024-08-06 NOTE — ASSESSMENT & PLAN NOTE
onset Raynaud's 2024.  No digital ulcers.  Relatively mild.  She has no interest in prescription medication for this   she will let me know if she needs medication such as calcium channel blocker nifedipine in the future   To help prevent the occurrence of Raynaud's phenomenon, preventative measures involve avoiding caffeine, not smoking, keeping the entire body warm, avoiding cold when possible and wearing gloves or mittens when exposed to colder temperatures.

## 2024-08-06 NOTE — PROGRESS NOTES
Office Follow Up      Date: 08/06/2024   Patient Name: Sade Jha  MRN: [id number]  YOB: 1975    Referring Physician: Provider, No Known     Chief Complaint:   Chief Complaint   Patient presents with    Lupus       History of Present Illness: Sade Jha is a 49 y.o. female who is here today for follow up on joint pain,arthritis with positive EDMUNDO.  There is suspicion for lupus.  She was initially sent 12/13/23 in consultation from [facility] Dr. Art for suspected inflammatory arthritis hands.    The patient reports that she fell on her hands in May 2023 and ever since then she has had pain and stiffness in both hands.  She had swelling of her right 3rd and 4th PIP joints that occurred spontaneously and for many months.  She tried over-the-counter NSAIDs without relief of pain or stiffness in the hands.  X-rays of her hands with Dr. Art showed osteoarthritic changes but no erosions.  Lab testing for rheumatoid arthritis has been negative but did show weakly positive EDMUNDO.  Inflammatory markers were negative.    She had steroid injection right hand 3rd 4th PIP joint with Dr. Art 10/31/23.  This injection has brought down the swelling in the finger joints.      She reports intermittent malar rash.  No photosensitivity.  Positive intermittent oral ulcers.  No pleurisy pericarditis.  No renal hematologic abnormalities.  No clotting disorder.  She does have a seizure disorder followed by [facility].  No psoriasis.  No iritis.     She has now developed Raynaud's involving her fingers.  Joints are very stiff and sore in the morning.    She does have a cousin with lupus.    Interim 8/6/2024: She reports remarkable improvement since starting on Plaquenil April 2024.  Much less joint pain.  No swollen joints.  Some achy joints.  Raynaud's is relatively mild.  No digital ulcerations.  Some numbness and tingling in the arms that comes and  "goes.      Subjective       Review of Systems: Review of Systems   Constitutional:  Negative for chills, fatigue, fever and unexpected weight loss.   HENT:  Negative for mouth sores, sinus pressure and sore throat.         Oral ulcer   Dry mouth  Nose sores   Eyes:  Positive for visual disturbance. Negative for pain and redness.        Dry eyes   Respiratory:  Positive for cough and shortness of breath.    Cardiovascular:  Negative for chest pain.        Claudication  Raynaud's   Gastrointestinal:  Positive for GERD. Negative for abdominal pain, blood in stool, diarrhea, nausea and vomiting.   Endocrine: Negative for polydipsia and polyuria.        Hair loss     Genitourinary:  Negative for dysuria, genital sores and hematuria.   Musculoskeletal:  Positive for arthralgias. Negative for back pain, joint swelling, myalgias, neck pain and neck stiffness.        Joint tenderness  Muscle cramping   Skin:  Negative for rash and bruise.        Psoriasis  Photosensitvity  Malar rash   Allergic/Immunologic: Negative for immunocompromised state.   Neurological:  Negative for seizures, weakness, numbness and memory problem.   Hematological:  Negative for adenopathy. Does not bruise/bleed easily.   Psychiatric/Behavioral:  Negative for depressed mood. The patient is not nervous/anxious.         Medications:   Current Outpatient Medications:     Plaquenil 200 MG tablet, Take 1 tablet by mouth Daily., Disp: 90 tablet, Rfl: 1    Allergies: No Known Allergies    I have reviewed and updated the patient's chief complaint, history of present illness, review of systems, past medical history, surgical history, family history, social history, medications and allergy list as appropriate.     Objective        Vital Signs:   Vitals:    08/06/24 1430   BP: 122/76   BP Location: Left arm   Patient Position: Sitting   Cuff Size: Adult   Pulse: 71   Temp: 98 °F (36.7 °C)   Weight: 56.9 kg (125 lb 8 oz)   Height: 160 cm (62.99\")   PainSc:   6 " "  PainLoc: Ankle  Comment: left heel     Body mass index is 22.24 kg/m².      Physical Exam:  Physical Exam   MUSCULOSKELETAL:   No peripheral synovitis   No pitting of the nails.  No joint deformities.  No tenderness lumbar spine or sacroiliac joints  Tender left trochanteric bursa to palpation    Complete joint exam was performed including the MCPs, PIPs, DIPs of the hands, wrists, elbows, shoulders, hips, knees and ankles.  No soft tissue swelling or tenderness is present except as above.    General: The patient is well-developed and well nourished. Cooperative, alert and oriented. Affect is normal. Hydration appears normal.   HEENT: Normocephalic and atraumatic. No notable alopecia. Lids and conjunctiva are normal. Pupils are equal and sclera are clear. Oropharynx is clear   NECK neck is supple without adenopathy, masses or thyromegaly.   CARDIOVASCULAR: Regular rate and rhythm. No murmurs, rubs or gallops   LUNGS: Effort is normal. Lungs are clear bilateral   ABDOMEN: Not examined  EXTREMITIES: Peripheral pulses are intact. No clubbing.   SKIN: No rashes. No subcutaneous nodules. No digital ulcers. No sclerodactyly.   NEUROLOGIC: Gait is normal. Strength testing is normal.  No focal neurologic deficits    Results Review:   Labs:   Lab Results   Component Value Date    GLUCOSE 91 10/08/2023    BUN 12 10/08/2023    CREATININE 0.87 10/08/2023    EGFR 82.8 10/08/2023    BCR 13.8 10/08/2023    K 3.9 10/08/2023    CO2 20.0 (L) 10/08/2023    CALCIUM 8.7 10/08/2023    ALBUMIN 4.0 10/08/2023    BILITOT 0.5 10/08/2023    AST 20 10/08/2023    ALT 14 10/08/2023     Lab Results   Component Value Date    WBC 7.90 10/08/2023    HGB 12.0 10/08/2023    HCT 36.1 10/08/2023    MCV 95.3 10/08/2023     10/08/2023     No results found for: \"SEDRATE\"  No results found for: \"CRP\"  No results found for: \"QUANTIFERO\", \"QUANTITB1\", \"QUANTITB2\", \"QUANTIFERN\", \"QUANTIFERM\", \"QUANTITBGLDP\"  No results found for: \"RF\"  No results " "found for: \"HEPBSAG\", \"HEPAIGM\", \"HEPBIGMCORE\", \"HEPCVIRUSABY\"      Procedures    Assessment / Plan        Assessment & Plan  Systemic lupus erythematosus, unspecified SLE type, unspecified organ involvement status  Lived in [place], [place], [facility] since around 2019  Homemaker, , 3 children, 2 at .  Mother with vasculitis  Consult 12/13/23 from Dr. Art [facility] inflammatory arthritis hands  Pain and swelling bilateral hands since May 2023 after a fall  Dr. Art injected steroid right hand 3rd 4th PIP joints 10/31/23  Labs 10/31/23:  +EDMUNDO 1:80, negative rheumatoid factor/ACPA/sed rate/CRP/uric acid  Labs 4/12/2024: +EDMUNDO 1:40, negative TRINITY panel, negative lupus anticoagulant/cardiolipin antibody/beta-2 glycoprotein, negative urinalysis, negative RF/anti-CCP antibody, normal CBC except for mildly elevated platelets, normal CMP  **Current: Plaquenil 4/12/24  Prior meloxicam    Possible systemic lupus based on intermittent malar rash, oral ulcers, inflammatory arthritis, Raynaud's, history of seizures, positive EDMUNDO.  There is family history of lupus in a cousin  This could also be possible seronegative RA as well as    -Overall low disease activity today.  No swollen joints.    She reports remarkable improvement since starting on Plaquenil 200 mg once daily April 2024.  Plaquenil well-tolerated.  Strong encourage Plaquenil eye exam for monitoring  Labs ordered today for monitoring as below   -Recommend she follow-up with [facility] orthoped[facility] Dr Art for intermittent numbness and tingling arms that could be from carpal tunnel syndrome.  Try carpal tunnel wrist splints at night.  Return to clinic 4 months    EDMUNDO positive  Suspect systemic lupus versus seronegative RA.  See above discussion  High risk medication use  Plaquenil    Well-tolerated and effective   I discussed the side effects of hydroxychloroquine including but not limited to GI upset, rash, photosensitivity, Hematologic and liver " abnormalities and ocular abnormalities and need for frequent eye exam for toxicity monitoring.  Raynaud's disease without gangrene  onset Raynaud's 2024.  No digital ulcers.  Relatively mild.  She has no interest in prescription medication for this   she will let me know if she needs medication such as calcium channel blocker nifedipine in the future   To help prevent the occurrence of Raynaud's phenomenon, preventative measures involve avoiding caffeine, not smoking, keeping the entire body warm, avoiding cold when possible and wearing gloves or mittens when exposed to colder temperatures.  Primary osteoarthritis of both hands  Recommend p.r.n. NSAID   Risks of NSAIDs discussed including GI upset, GI bleeding, renal and hepatic risks and the risks of cardiovascular disease and stroke. Warned patient not to take with other NSAIDs including OTC NSAIDs.    History of seizure  followed by [facility] Dr. Shaver once yearly    Orders Placed This Encounter   Procedures    Comprehensive Metabolic Panel    CBC Auto Differential    C-reactive Protein    Sedimentation Rate    Urinalysis With Culture If Indicated -    Protein / Creatinine Ratio, Urine - Urine, Clean Catch     New Medications Ordered This Visit   Medications    Plaquenil 200 MG tablet     Sig: Take 1 tablet by mouth Daily.     Dispense:  90 tablet     Refill:  1           Follow Up:   Return in about 4 months (around 12/6/2024).        Jomar Guerrero MD  [facility]

## 2024-08-06 NOTE — ASSESSMENT & PLAN NOTE
Recommend p.r.n. NSAID   Risks of NSAIDs discussed including GI upset, GI bleeding, renal and hepatic risks and the risks of cardiovascular disease and stroke. Warned patient not to take with other NSAIDs including OTC NSAIDs.

## 2024-08-07 ENCOUNTER — TELEPHONE (OUTPATIENT)
Age: 49
End: 2024-08-07
Payer: COMMERCIAL

## 2024-08-07 NOTE — TELEPHONE ENCOUNTER
Patient called stating she saw ADB yesterday and he ordered labs for her to do at [facility] but she prefers [facility]. Changed lab order to external and faxed to [facility] on [address] per her request.

## 2025-08-22 ENCOUNTER — APPOINTMENT (OUTPATIENT)
Facility: HOSPITAL | Age: 50
End: 2025-08-22
Payer: COMMERCIAL

## 2025-08-22 ENCOUNTER — HOSPITAL ENCOUNTER (EMERGENCY)
Facility: HOSPITAL | Age: 50
Discharge: HOME | End: 2025-08-22
Attending: STUDENT IN AN ORGANIZED HEALTH CARE EDUCATION/TRAINING PROGRAM
Payer: COMMERCIAL

## 2025-08-29 ENCOUNTER — PREP FOR SURGERY (OUTPATIENT)
Dept: OTHER | Facility: HOSPITAL | Age: 50
End: 2025-08-29
Payer: COMMERCIAL

## 2025-08-29 ENCOUNTER — OFFICE VISIT (OUTPATIENT)
Dept: ORTHOPEDIC SURGERY | Facility: CLINIC | Age: 50
End: 2025-08-29
Payer: COMMERCIAL

## 2025-08-29 VITALS
WEIGHT: 128 LBS | DIASTOLIC BLOOD PRESSURE: 74 MMHG | SYSTOLIC BLOOD PRESSURE: 124 MMHG | BODY MASS INDEX: 22.68 KG/M2 | HEIGHT: 63 IN

## 2025-08-29 DIAGNOSIS — M79.671 RIGHT FOOT PAIN: Primary | ICD-10-CM

## 2025-08-29 DIAGNOSIS — S99.921A INJURY OF RIGHT FOOT, INITIAL ENCOUNTER: ICD-10-CM

## 2025-08-29 DIAGNOSIS — S99.921A INJURY OF RIGHT FOOT, INITIAL ENCOUNTER: Primary | ICD-10-CM
